# Patient Record
Sex: FEMALE | NOT HISPANIC OR LATINO
[De-identification: names, ages, dates, MRNs, and addresses within clinical notes are randomized per-mention and may not be internally consistent; named-entity substitution may affect disease eponyms.]

---

## 2020-08-18 DIAGNOSIS — Z11.59 ENCOUNTER FOR SCREENING FOR OTHER VIRAL DISEASES: ICD-10-CM

## 2020-09-01 ENCOUNTER — TRANSCRIPTION ENCOUNTER (OUTPATIENT)
Age: 35
End: 2020-09-01

## 2020-09-04 ENCOUNTER — APPOINTMENT (OUTPATIENT)
Dept: INTERNAL MEDICINE | Facility: CLINIC | Age: 35
End: 2020-09-04
Payer: COMMERCIAL

## 2020-09-04 ENCOUNTER — NON-APPOINTMENT (OUTPATIENT)
Age: 35
End: 2020-09-04

## 2020-09-04 VITALS — SYSTOLIC BLOOD PRESSURE: 100 MMHG | HEIGHT: 66 IN | DIASTOLIC BLOOD PRESSURE: 60 MMHG | OXYGEN SATURATION: 100 %

## 2020-09-04 VITALS — WEIGHT: 134 LBS | BODY MASS INDEX: 21.63 KG/M2 | TEMPERATURE: 97.7 F

## 2020-09-04 DIAGNOSIS — R01.1 CARDIAC MURMUR, UNSPECIFIED: ICD-10-CM

## 2020-09-04 DIAGNOSIS — Z82.3 FAMILY HISTORY OF STROKE: ICD-10-CM

## 2020-09-04 DIAGNOSIS — Z84.89 FAMILY HISTORY OF OTHER SPECIFIED CONDITIONS: ICD-10-CM

## 2020-09-04 DIAGNOSIS — Z78.9 OTHER SPECIFIED HEALTH STATUS: ICD-10-CM

## 2020-09-04 DIAGNOSIS — Z83.49 FAMILY HISTORY OF OTHER ENDOCRINE, NUTRITIONAL AND METABOLIC DISEASES: ICD-10-CM

## 2020-09-04 DIAGNOSIS — E04.2 NONTOXIC MULTINODULAR GOITER: ICD-10-CM

## 2020-09-04 DIAGNOSIS — Z92.89 PERSONAL HISTORY OF OTHER MEDICAL TREATMENT: ICD-10-CM

## 2020-09-04 DIAGNOSIS — Z00.00 ENCOUNTER FOR GENERAL ADULT MEDICAL EXAMINATION W/OUT ABNORMAL FINDINGS: ICD-10-CM

## 2020-09-04 DIAGNOSIS — Z83.3 FAMILY HISTORY OF DIABETES MELLITUS: ICD-10-CM

## 2020-09-04 DIAGNOSIS — Z82.62 FAMILY HISTORY OF OSTEOPOROSIS: ICD-10-CM

## 2020-09-04 DIAGNOSIS — H61.23 IMPACTED CERUMEN, BILATERAL: ICD-10-CM

## 2020-09-04 LAB
25(OH)D3 SERPL-MCNC: 40.6 NG/ML
ALBUMIN SERPL ELPH-MCNC: 4.9 G/DL
ALP BLD-CCNC: 42 U/L
ALT SERPL-CCNC: 32 U/L
ANION GAP SERPL CALC-SCNC: 12 MMOL/L
APPEARANCE: CLEAR
AST SERPL-CCNC: 26 U/L
BASOPHILS # BLD AUTO: 0.05 K/UL
BASOPHILS NFR BLD AUTO: 1.1 %
BILIRUB SERPL-MCNC: 0.9 MG/DL
BILIRUBIN URINE: NEGATIVE
BLOOD URINE: NEGATIVE
BUN SERPL-MCNC: 11 MG/DL
CALCIUM SERPL-MCNC: 9.8 MG/DL
CHLORIDE SERPL-SCNC: 103 MMOL/L
CHOLEST SERPL-MCNC: 219 MG/DL
CHOLEST/HDLC SERPL: 3 RATIO
CO2 SERPL-SCNC: 25 MMOL/L
COLOR: NORMAL
CREAT SERPL-MCNC: 0.76 MG/DL
DSDNA AB SER-ACNC: <12 IU/ML
EOSINOPHIL # BLD AUTO: 0.11 K/UL
EOSINOPHIL NFR BLD AUTO: 2.3 %
ESTIMATED AVERAGE GLUCOSE: 103 MG/DL
GLUCOSE QUALITATIVE U: NEGATIVE
GLUCOSE SERPL-MCNC: 91 MG/DL
HBA1C MFR BLD HPLC: 5.2 %
HCT VFR BLD CALC: 41.7 %
HDLC SERPL-MCNC: 74 MG/DL
HGB BLD-MCNC: 13 G/DL
IMM GRANULOCYTES NFR BLD AUTO: 0.2 %
KETONES URINE: NEGATIVE
LDLC SERPL CALC-MCNC: 133 MG/DL
LEUKOCYTE ESTERASE URINE: NEGATIVE
LYMPHOCYTES # BLD AUTO: 1.59 K/UL
LYMPHOCYTES NFR BLD AUTO: 33.4 %
M TB IFN-G BLD-IMP: NEGATIVE
MAN DIFF?: NORMAL
MCHC RBC-ENTMCNC: 29 PG
MCHC RBC-ENTMCNC: 31.2 GM/DL
MCV RBC AUTO: 92.9 FL
MONOCYTES # BLD AUTO: 0.39 K/UL
MONOCYTES NFR BLD AUTO: 8.2 %
NEUTROPHILS # BLD AUTO: 2.61 K/UL
NEUTROPHILS NFR BLD AUTO: 54.8 %
NITRITE URINE: NEGATIVE
PH URINE: 8
PLATELET # BLD AUTO: 217 K/UL
POTASSIUM SERPL-SCNC: 4.6 MMOL/L
PROT SERPL-MCNC: 6.8 G/DL
PROTEIN URINE: NORMAL
QUANTIFERON TB PLUS MITOGEN MINUS NIL: 7.18 IU/ML
QUANTIFERON TB PLUS NIL: 0.07 IU/ML
QUANTIFERON TB PLUS TB1 MINUS NIL: 0.14 IU/ML
QUANTIFERON TB PLUS TB2 MINUS NIL: 0.07 IU/ML
RBC # BLD: 4.49 M/UL
RBC # FLD: 13.1 %
SARS-COV-2 IGG SERPL IA-ACNC: <3.8 AU/ML
SARS-COV-2 IGG SERPL QL IA: NEGATIVE
SODIUM SERPL-SCNC: 140 MMOL/L
SPECIFIC GRAVITY URINE: 1.02
TRIGL SERPL-MCNC: 61 MG/DL
TSH SERPL-ACNC: 1.14 UIU/ML
UROBILINOGEN URINE: NORMAL
WBC # FLD AUTO: 4.76 K/UL

## 2020-09-04 PROCEDURE — 93000 ELECTROCARDIOGRAM COMPLETE: CPT

## 2020-09-04 PROCEDURE — 99203 OFFICE O/P NEW LOW 30 MIN: CPT | Mod: 25

## 2020-09-04 PROCEDURE — 99385 PREV VISIT NEW AGE 18-39: CPT | Mod: 25

## 2020-09-05 PROBLEM — Z78.9 SOCIAL ALCOHOL USE: Status: ACTIVE | Noted: 2020-09-05

## 2020-09-05 PROBLEM — R01.1 CARDIAC MURMUR: Status: ACTIVE | Noted: 2020-09-05

## 2020-09-05 PROBLEM — Z92.89 HISTORY OF POSITIVE DOUBLE STRANDED DNA ANTIBODY TEST: Status: RESOLVED | Noted: 2020-09-05 | Resolved: 2020-09-05

## 2020-09-05 PROBLEM — Z82.3 FAMILY HISTORY OF CEREBROVASCULAR ACCIDENT (CVA): Status: ACTIVE | Noted: 2020-09-05

## 2020-09-05 PROBLEM — H61.23 BILATERAL IMPACTED CERUMEN: Status: ACTIVE | Noted: 2020-09-05

## 2020-09-05 PROBLEM — Z84.89 PATIENT'S FATHER IS DECEASED: Status: ACTIVE | Noted: 2020-09-05

## 2020-09-05 PROBLEM — E04.2 MULTIPLE THYROID NODULES: Status: ACTIVE | Noted: 2020-09-04

## 2020-09-05 RX ORDER — MULTIVIT-MIN/IRON/FOLIC ACID/K 18-600-40
500 CAPSULE ORAL DAILY
Refills: 0 | Status: ACTIVE | COMMUNITY
Start: 2020-09-05

## 2020-09-05 RX ORDER — OMEGA-3/DHA/EPA/FISH OIL 1200 MG
1200 CAPSULE ORAL DAILY
Refills: 0 | Status: ACTIVE | COMMUNITY
Start: 2020-09-05

## 2020-09-08 PROBLEM — Z82.62 FAMILY HISTORY OF OSTEOPOROSIS: Status: ACTIVE | Noted: 2020-09-08

## 2020-09-08 PROBLEM — Z83.3 FAMILY HISTORY OF TYPE 2 DIABETES MELLITUS: Status: ACTIVE | Noted: 2020-09-08

## 2020-09-08 PROBLEM — Z83.49 FAMILY HISTORY OF THYROID DISEASE: Status: ACTIVE | Noted: 2020-09-08

## 2020-09-18 ENCOUNTER — APPOINTMENT (OUTPATIENT)
Dept: OBGYN | Facility: CLINIC | Age: 35
End: 2020-09-18
Payer: COMMERCIAL

## 2020-09-18 VITALS
DIASTOLIC BLOOD PRESSURE: 76 MMHG | BODY MASS INDEX: 22.66 KG/M2 | HEIGHT: 66 IN | HEART RATE: 74 BPM | WEIGHT: 141 LBS | SYSTOLIC BLOOD PRESSURE: 116 MMHG

## 2020-09-18 DIAGNOSIS — Z01.411 ENCOUNTER FOR GYNECOLOGICAL EXAMINATION (GENERAL) (ROUTINE) WITH ABNORMAL FINDINGS: ICD-10-CM

## 2020-09-18 DIAGNOSIS — E04.1 NONTOXIC SINGLE THYROID NODULE: ICD-10-CM

## 2020-09-18 PROCEDURE — 99385 PREV VISIT NEW AGE 18-39: CPT

## 2020-09-18 NOTE — DISCUSSION/SUMMARY
[FreeTextEntry1] : A - WWV\par       family hx of breast ca\par        left breast mass\par       enlarged right  lobe of thyroid\par \par P- - diagnostic left breast mammo and sono referral given \par        pap done\par         nutritional counseling provided\par        exercise encouraged \par       sono of thyroid referral given \par      literature on egg freezing program given

## 2020-09-18 NOTE — PHYSICAL EXAM
[Appropriately responsive] : appropriately responsive [Alert] : alert [No Acute Distress] : no acute distress [Thyroid Nodule] : thyroid nodule [Regular Rate Rhythm] : regular rate rhythm [No Murmurs] : no murmurs [Clear to Auscultation B/L] : clear to auscultation bilaterally [Soft] : soft [Non-tender] : non-tender [Non-distended] : non-distended [No HSM] : No HSM [No Lesions] : no lesions [No Mass] : no mass [Oriented x3] : oriented x3 [___cm] : a ~M [unfilled] ~Ucm superior medial quadrant mass was palpated [Labia Majora] : normal [Labia Minora] : normal [Normal] : normal [Retroversion] : retroverted [Uterine Adnexae] : normal

## 2020-09-18 NOTE — HISTORY OF PRESENT ILLNESS
[FreeTextEntry1] : 35 y.o. P 0000  LNMP 9/11/20, regular cycles presents for annual exam. pt is a 1st year resident in [employer], pt feels well, PMH - negative, PSHx - cyst removal from left side of neck, - 2006, FH - Breast ca - PGM, MGM, mat. great aunt , aunt. Type 2 Diabetes - MGF, Hypothyroidism - MGM, mat. aunts . SH- negative, GYN hx - bartholin cyst, drainage on left side - 2016.  ROS- pt has right lobe thyroid nodule, currently being investigated with repeat sono. pt also had a concern with palpable area in left breast , not realized by sono . pt is scheduled for repeat sono next month.

## 2020-09-21 LAB — HPV HIGH+LOW RISK DNA PNL CVX: NOT DETECTED

## 2020-09-23 LAB — CYTOLOGY CVX/VAG DOC THIN PREP: NORMAL

## 2020-10-08 ENCOUNTER — APPOINTMENT (OUTPATIENT)
Dept: ULTRASOUND IMAGING | Facility: CLINIC | Age: 35
End: 2020-10-08
Payer: COMMERCIAL

## 2020-10-08 ENCOUNTER — RESULT REVIEW (OUTPATIENT)
Age: 35
End: 2020-10-08

## 2020-10-08 ENCOUNTER — OUTPATIENT (OUTPATIENT)
Dept: OUTPATIENT SERVICES | Facility: HOSPITAL | Age: 35
LOS: 1 days | End: 2020-10-08
Payer: COMMERCIAL

## 2020-10-08 ENCOUNTER — APPOINTMENT (OUTPATIENT)
Dept: MAMMOGRAPHY | Facility: CLINIC | Age: 35
End: 2020-10-08
Payer: COMMERCIAL

## 2020-10-08 DIAGNOSIS — E04.1 NONTOXIC SINGLE THYROID NODULE: ICD-10-CM

## 2020-10-08 PROCEDURE — 76536 US EXAM OF HEAD AND NECK: CPT | Mod: 26

## 2020-10-08 PROCEDURE — 77066 DX MAMMO INCL CAD BI: CPT | Mod: 26

## 2020-10-08 PROCEDURE — G0279: CPT | Mod: 26

## 2020-10-08 PROCEDURE — 76641 ULTRASOUND BREAST COMPLETE: CPT | Mod: 26,50

## 2020-10-08 PROCEDURE — 76536 US EXAM OF HEAD AND NECK: CPT

## 2020-10-08 PROCEDURE — 77066 DX MAMMO INCL CAD BI: CPT

## 2020-10-08 PROCEDURE — 76641 ULTRASOUND BREAST COMPLETE: CPT

## 2020-10-08 PROCEDURE — G0279: CPT

## 2020-10-16 ENCOUNTER — APPOINTMENT (OUTPATIENT)
Dept: ULTRASOUND IMAGING | Facility: CLINIC | Age: 35
End: 2020-10-16
Payer: COMMERCIAL

## 2020-10-16 ENCOUNTER — OUTPATIENT (OUTPATIENT)
Dept: OUTPATIENT SERVICES | Facility: HOSPITAL | Age: 35
LOS: 1 days | End: 2020-10-16
Payer: COMMERCIAL

## 2020-10-16 ENCOUNTER — RESULT REVIEW (OUTPATIENT)
Age: 35
End: 2020-10-16

## 2020-10-16 DIAGNOSIS — Z00.8 ENCOUNTER FOR OTHER GENERAL EXAMINATION: ICD-10-CM

## 2020-10-16 DIAGNOSIS — N63.20 UNSPECIFIED LUMP IN THE LEFT BREAST, UNSPECIFIED QUADRANT: ICD-10-CM

## 2020-10-16 PROCEDURE — 77065 DX MAMMO INCL CAD UNI: CPT

## 2020-10-16 PROCEDURE — 88360 TUMOR IMMUNOHISTOCHEM/MANUAL: CPT | Mod: 26

## 2020-10-16 PROCEDURE — 88360 TUMOR IMMUNOHISTOCHEM/MANUAL: CPT

## 2020-10-16 PROCEDURE — 19083 BX BREAST 1ST LESION US IMAG: CPT | Mod: LT

## 2020-10-16 PROCEDURE — 77065 DX MAMMO INCL CAD UNI: CPT | Mod: 26,LT

## 2020-10-16 PROCEDURE — 88305 TISSUE EXAM BY PATHOLOGIST: CPT | Mod: 26

## 2020-10-16 PROCEDURE — 19084 BX BREAST ADD LESION US IMAG: CPT | Mod: LT

## 2020-10-16 PROCEDURE — 19084 BX BREAST ADD LESION US IMAG: CPT

## 2020-10-16 PROCEDURE — 88305 TISSUE EXAM BY PATHOLOGIST: CPT

## 2020-10-16 PROCEDURE — A4648: CPT

## 2020-10-16 PROCEDURE — 19083 BX BREAST 1ST LESION US IMAG: CPT

## 2020-10-19 LAB — SURGICAL PATHOLOGY STUDY: SIGNIFICANT CHANGE UP

## 2020-10-20 ENCOUNTER — NON-APPOINTMENT (OUTPATIENT)
Age: 35
End: 2020-10-20

## 2020-10-21 ENCOUNTER — OUTPATIENT (OUTPATIENT)
Dept: OUTPATIENT SERVICES | Facility: HOSPITAL | Age: 35
LOS: 1 days | End: 2020-10-21
Payer: COMMERCIAL

## 2020-10-21 ENCOUNTER — RESULT REVIEW (OUTPATIENT)
Age: 35
End: 2020-10-21

## 2020-10-21 ENCOUNTER — APPOINTMENT (OUTPATIENT)
Dept: MRI IMAGING | Facility: IMAGING CENTER | Age: 35
End: 2020-10-21
Payer: COMMERCIAL

## 2020-10-21 DIAGNOSIS — Z00.8 ENCOUNTER FOR OTHER GENERAL EXAMINATION: ICD-10-CM

## 2020-10-21 LAB — NON-GYNECOLOGICAL CYTOLOGY STUDY: SIGNIFICANT CHANGE UP

## 2020-10-21 PROCEDURE — 77049 MRI BREAST C-+ W/CAD BI: CPT | Mod: 26

## 2020-10-21 PROCEDURE — C8908: CPT

## 2020-10-21 PROCEDURE — C8937: CPT

## 2020-10-21 PROCEDURE — A9585: CPT

## 2020-10-26 ENCOUNTER — APPOINTMENT (OUTPATIENT)
Dept: SURGERY | Facility: CLINIC | Age: 35
End: 2020-10-26
Payer: COMMERCIAL

## 2020-10-26 PROCEDURE — 99205K: CUSTOM

## 2020-10-27 ENCOUNTER — APPOINTMENT (OUTPATIENT)
Dept: NUCLEAR MEDICINE | Facility: IMAGING CENTER | Age: 35
End: 2020-10-27
Payer: COMMERCIAL

## 2020-10-27 ENCOUNTER — RESULT REVIEW (OUTPATIENT)
Age: 35
End: 2020-10-27

## 2020-10-27 ENCOUNTER — APPOINTMENT (OUTPATIENT)
Dept: CT IMAGING | Facility: IMAGING CENTER | Age: 35
End: 2020-10-27
Payer: COMMERCIAL

## 2020-10-27 ENCOUNTER — OUTPATIENT (OUTPATIENT)
Dept: OUTPATIENT SERVICES | Facility: HOSPITAL | Age: 35
LOS: 1 days | End: 2020-10-27
Payer: COMMERCIAL

## 2020-10-27 ENCOUNTER — OUTPATIENT (OUTPATIENT)
Dept: OUTPATIENT SERVICES | Facility: HOSPITAL | Age: 35
LOS: 1 days | Discharge: HOME | End: 2020-10-27

## 2020-10-27 DIAGNOSIS — Z00.8 ENCOUNTER FOR OTHER GENERAL EXAMINATION: ICD-10-CM

## 2020-10-27 DIAGNOSIS — C50.919 MALIGNANT NEOPLASM OF UNSPECIFIED SITE OF UNSPECIFIED FEMALE BREAST: ICD-10-CM

## 2020-10-27 PROCEDURE — 71260 CT THORAX DX C+: CPT | Mod: 26

## 2020-10-27 PROCEDURE — 78306 BONE IMAGING WHOLE BODY: CPT

## 2020-10-27 PROCEDURE — 78306 BONE IMAGING WHOLE BODY: CPT | Mod: 26

## 2020-10-27 PROCEDURE — 78830 RP LOCLZJ TUM SPECT W/CT 1: CPT

## 2020-10-27 PROCEDURE — 78830 RP LOCLZJ TUM SPECT W/CT 1: CPT | Mod: 26

## 2020-10-27 PROCEDURE — 71260 CT THORAX DX C+: CPT

## 2020-10-27 PROCEDURE — A9561: CPT

## 2020-10-27 PROCEDURE — 74177 CT ABD & PELVIS W/CONTRAST: CPT | Mod: 26

## 2020-10-27 PROCEDURE — 74177 CT ABD & PELVIS W/CONTRAST: CPT

## 2020-10-28 ENCOUNTER — RESULT REVIEW (OUTPATIENT)
Age: 35
End: 2020-10-28

## 2020-10-28 ENCOUNTER — APPOINTMENT (OUTPATIENT)
Dept: HEMATOLOGY ONCOLOGY | Facility: CLINIC | Age: 35
End: 2020-10-28
Payer: COMMERCIAL

## 2020-10-28 VITALS
HEART RATE: 92 BPM | OXYGEN SATURATION: 99 % | TEMPERATURE: 99.1 F | SYSTOLIC BLOOD PRESSURE: 123 MMHG | HEIGHT: 66.85 IN | BODY MASS INDEX: 22.49 KG/M2 | RESPIRATION RATE: 16 BRPM | WEIGHT: 143.3 LBS | DIASTOLIC BLOOD PRESSURE: 61 MMHG

## 2020-10-28 DIAGNOSIS — Z80.3 FAMILY HISTORY OF MALIGNANT NEOPLASM OF BREAST: ICD-10-CM

## 2020-10-28 LAB
BASOPHILS # BLD AUTO: 0.03 K/UL — SIGNIFICANT CHANGE UP (ref 0–0.2)
BASOPHILS NFR BLD AUTO: 0.5 % — SIGNIFICANT CHANGE UP (ref 0–2)
EOSINOPHIL # BLD AUTO: 0.08 K/UL — SIGNIFICANT CHANGE UP (ref 0–0.5)
EOSINOPHIL NFR BLD AUTO: 1.4 % — SIGNIFICANT CHANGE UP (ref 0–6)
HCT VFR BLD CALC: 38.5 % — SIGNIFICANT CHANGE UP (ref 34.5–45)
HGB BLD-MCNC: 12.5 G/DL — SIGNIFICANT CHANGE UP (ref 11.5–15.5)
IMM GRANULOCYTES NFR BLD AUTO: 0.2 % — SIGNIFICANT CHANGE UP (ref 0–1.5)
LYMPHOCYTES # BLD AUTO: 1.5 K/UL — SIGNIFICANT CHANGE UP (ref 1–3.3)
LYMPHOCYTES # BLD AUTO: 27.1 % — SIGNIFICANT CHANGE UP (ref 13–44)
MCHC RBC-ENTMCNC: 29.7 PG — SIGNIFICANT CHANGE UP (ref 27–34)
MCHC RBC-ENTMCNC: 32.5 G/DL — SIGNIFICANT CHANGE UP (ref 32–36)
MCV RBC AUTO: 91.4 FL — SIGNIFICANT CHANGE UP (ref 80–100)
MONOCYTES # BLD AUTO: 0.46 K/UL — SIGNIFICANT CHANGE UP (ref 0–0.9)
MONOCYTES NFR BLD AUTO: 8.3 % — SIGNIFICANT CHANGE UP (ref 2–14)
NEUTROPHILS # BLD AUTO: 3.45 K/UL — SIGNIFICANT CHANGE UP (ref 1.8–7.4)
NEUTROPHILS NFR BLD AUTO: 62.5 % — SIGNIFICANT CHANGE UP (ref 43–77)
NRBC # BLD: 0 /100 WBCS — SIGNIFICANT CHANGE UP (ref 0–0)
PLATELET # BLD AUTO: 209 K/UL — SIGNIFICANT CHANGE UP (ref 150–400)
RBC # BLD: 4.21 M/UL — SIGNIFICANT CHANGE UP (ref 3.8–5.2)
RBC # FLD: 13.2 % — SIGNIFICANT CHANGE UP (ref 10.3–14.5)
WBC # BLD: 5.53 K/UL — SIGNIFICANT CHANGE UP (ref 3.8–10.5)
WBC # FLD AUTO: 5.53 K/UL — SIGNIFICANT CHANGE UP (ref 3.8–10.5)

## 2020-10-28 PROCEDURE — 99205 OFFICE O/P NEW HI 60 MIN: CPT

## 2020-10-28 PROCEDURE — 99354: CPT

## 2020-10-28 PROCEDURE — 99072 ADDL SUPL MATRL&STAF TM PHE: CPT

## 2020-10-28 NOTE — HISTORY OF PRESENT ILLNESS
[T: ___] : T[unfilled] [N: ___] : N[unfilled] [M: ___] : M[unfilled] [AJCC Stage: ____] : AJCC Stage: [unfilled] [de-identified] : Ms.Ana López is a 35 year old female here for an evaluation of breast cancer. Her oncologic history is as follows:\par \par Patient felt a palpable lump in 8/2020. She underwent breast imaging on 10/8/2020 BIRADS 4B which showed  a 3.2 cm mass in the central upper slightly inner left breast and a lobulated solid mass with cystic components measuring 2.6 x 2.5 x 3.3 cm. There is also a smaller anterior mass measuring 7mm. US showed a large lobulated mass in the central upper inner left breast at the 10:00 axis 2 cm FN.  She underwent a left breast 10 o'clock, 2 cm FN, ultrasound guided core biopsy on 10/16/2020 which showed invasive poorly differentiated ductal carcinoma with focal central necrosis, [place] score 8/9 measuring least 0.8 cm, ER Positive, 10%, PgR Negative, 0%, HER-2 Negative\par \par She underwent a breast MRI on 10/21/2020 BIRADS 6 which showed  left breast malignancy, a dominant mass in the left inner breast, measuring 5.0 cm. There are multiple surrounding satellite masses. The majority of the upper inner and central breast demonstrates disease. The dominant mass and associated satellites measure approximately 6.5 cm transverse x 8.5 cm AP x  4.5 cm sagittal in overall greatest dimension. There is associated parenchymal edema and skin thickening involving the inner breast. There is also nonspecific enhancement of the left nipple. There are multiple matted lymph nodes in the left axilla. No evidence of internal mammary adenopathy. A nonenhancing cystic mass in the left anterior chest/mediastinum was also noted. \par \par 10/27/2020 NM BONE SCAN :No scan evidence of osseous metastasis.\par 9/4/2020 EKG NSR \par CT 10/27/2020 MARI, + epicardial cyst\par \par She has been  x 10 years.  is a  in [place]. Her family is in [place]. No children. She lives in [facility] and considering moving to [place]. \par \par Genetic testing sent 10/26/2020\par \par

## 2020-10-28 NOTE — CONSULT LETTER
[Dear  ___] : Dear  [unfilled], [Consult Letter:] : I had the pleasure of evaluating your patient, [unfilled]. [Please see my note below.] : Please see my note below. [Consult Closing:] : Thank you very much for allowing me to participate in the care of this patient.  If you have any questions, please do not hesitate to contact me. [Sincerely,] : Sincerely, [FreeTextEntry3] : Donya Ayoub MD\par Division of Medical Oncology and Hematology\par [facility] [place]\par [employer] [facility]\par [place], [place] [DrLv  ___] : Dr. LOPES [DrLv ___] : Dr. LOPES

## 2020-10-28 NOTE — REASON FOR VISIT
[Initial Consultation] : an initial consultation [Other: _____] : [unfilled] [FreeTextEntry2] : left breast ca

## 2020-10-28 NOTE — ASSESSMENT
[FreeTextEntry1] : Ms. RIANNA HERNANDEZ is a 35 year old premenopausal female who is diagnosed with clinically T2, N1, M0 stage IIB ER 10%, LA negative, HER-2/stella negative left breast poorly differentiated invasive ductal carcinoma.  CT scan chest abdomen pelvis and a bone scan is negative for distant metastases.\par \par I reviewed the role of surgery, chemotherapy, radiation therapy and endocrine therapy for curative treatment of breast cancer. I discussed the role of neoadjuvant chemotherapy with the patient including improved surgical outcomes, and early control of occult metastatic disease.  I recommended neoadjuvant chemotherapy with dose dense Adriamycin and cyclophosphamide for 4 cycles with growth factor support, followed by weekly Paclitaxel for both local and systemic control. \par \par I discussed the expected and unexpected side effects of chemotherapy, including but not limited to hair loss, fatigue, change in taste, mouth sores, nausea, vomiting, diarrhea, infusion reaction, allergic reaction, significant myelosuppression, need for blood transfusion, infection, bleeding, cardiac toxicity, neuropathy, chemical cystitis, kidney injury, nerve pain, muscle pain and detrimental effect on liver and heart function. I also discussed a small but potential risk of development of acute leukemia with the use of Adriamycin and cyclophosphamide therapy.\par \par The patient understood all the potential side effects and signed an informed consent after discussing the risks, benefits and alternatives.\par \par The patient is premenopausal. I discussed that her periods might stop after chemotherapy.  Chemotherapy is known to cause infertility and premature menopause.  She has been  for 10 years.  She is interested in childbearing.  I encouraged her to meet with Dr. Watkins to discuss reproductive endocrinology options.  Our breast cancer nurse navigator will expedite the appointment.\par \par In preparation of chemotherapy I will obtain an echocardiogram. She will be premedicated with Emend, Dexamethasone and Aloxi to decrease severe emetogenicity with the chemotherapy. We'll obtain blood work including complete metabolic profile, acute hepatitis panel and coagulation profile. My team will arrange for Mediport placement by interventional radiology. \par \par Patient is complaining of unexplained urinary incontinence during sleep x2 in the last year.  She has dizziness and vertigo type symptoms since diagnosis.  We discussed to do a brain MRI to rule out metastases (although unlikely).\par \par Genetic testing has been sent.  We will follow up the results.\par \par She will undergo surgery after neoadjuvant chemo.  Pathologic response will guide further treatment.  If she has residual disease, we will recheck ER/LA/HER-2.  Although she is ER 10% positive, aggressiveness of her cancer is more indicative of triple negative/basal subtype.  Given low ER positive disease, endocrine therapy will be considered.  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  Her family and spouse are in [place].  Her family wants her to come back to [place] for treatment but she wants to start treatment as soon as possible here in [place].  Her  was present on phone today.  He is willing to come here to help her.  [facility] paperwork will be filled out\par \par The patient had plenty of time to ask questions and all of her questions were answered to her satisfaction. I gave her my office phone number and encouraged her to call with any questions or additional information.\par  [Curative] : Goals of care discussed with patient: Curative

## 2020-10-28 NOTE — PHYSICAL EXAM
[Fully active, able to carry on all pre-disease performance without restriction] : Status 0 - Fully active, able to carry on all pre-disease performance without restriction [Normal] : affect appropriate [de-identified] : left breast: visible lump at 10:00. no skin changes or nipple retraction. On palpation- 5 cm hard, non tender, mobile mass at 10:00. + palpable left axillary LN. No abnormal right breast findings. No palpable cervical LN

## 2020-10-29 ENCOUNTER — APPOINTMENT (OUTPATIENT)
Dept: CV DIAGNOSITCS | Facility: HOSPITAL | Age: 35
End: 2020-10-29

## 2020-10-29 ENCOUNTER — APPOINTMENT (OUTPATIENT)
Dept: HUMAN REPRODUCTION | Facility: CLINIC | Age: 35
End: 2020-10-29
Payer: COMMERCIAL

## 2020-10-29 ENCOUNTER — OUTPATIENT (OUTPATIENT)
Dept: OUTPATIENT SERVICES | Facility: HOSPITAL | Age: 35
LOS: 1 days | End: 2020-10-29
Payer: COMMERCIAL

## 2020-10-29 DIAGNOSIS — C50.912 MALIGNANT NEOPLASM OF UNSPECIFIED SITE OF LEFT FEMALE BREAST: ICD-10-CM

## 2020-10-29 LAB
ALBUMIN SERPL ELPH-MCNC: 4.8 G/DL
ALP BLD-CCNC: 51 U/L
ALT SERPL-CCNC: 18 U/L
ANION GAP SERPL CALC-SCNC: 12 MMOL/L
APTT BLD: 27.8 SEC
AST SERPL-CCNC: 22 U/L
BILIRUB SERPL-MCNC: 0.7 MG/DL
BUN SERPL-MCNC: 13 MG/DL
CALCIUM SERPL-MCNC: 9.8 MG/DL
CHLORIDE SERPL-SCNC: 102 MMOL/L
CO2 SERPL-SCNC: 26 MMOL/L
CREAT SERPL-MCNC: 0.72 MG/DL
GLUCOSE SERPL-MCNC: 90 MG/DL
HAV IGM SER QL: NONREACTIVE
HBV CORE IGM SER QL: NONREACTIVE
HBV SURFACE AG SER QL: NONREACTIVE
HCV AB SER QL: NONREACTIVE
HCV S/CO RATIO: 0.08 S/CO
INR PPP: 0.94 RATIO
POTASSIUM SERPL-SCNC: 4.4 MMOL/L
PROT SERPL-MCNC: 7.1 G/DL
PT BLD: 11.2 SEC
SODIUM SERPL-SCNC: 140 MMOL/L

## 2020-10-29 PROCEDURE — 76830 TRANSVAGINAL US NON-OB: CPT

## 2020-10-29 PROCEDURE — 76376 3D RENDER W/INTRP POSTPROCES: CPT | Mod: 26

## 2020-10-29 PROCEDURE — 76376 3D RENDER W/INTRP POSTPROCES: CPT

## 2020-10-29 PROCEDURE — 99205 OFFICE O/P NEW HI 60 MIN: CPT | Mod: 25

## 2020-10-29 PROCEDURE — 93306 TTE W/DOPPLER COMPLETE: CPT

## 2020-10-29 PROCEDURE — 93356 MYOCRD STRAIN IMG SPCKL TRCK: CPT

## 2020-10-29 PROCEDURE — 93306 TTE W/DOPPLER COMPLETE: CPT | Mod: 26

## 2020-10-29 PROCEDURE — 99072 ADDL SUPL MATRL&STAF TM PHE: CPT

## 2020-10-29 PROCEDURE — 36415 COLL VENOUS BLD VENIPUNCTURE: CPT

## 2020-10-30 ENCOUNTER — OUTPATIENT (OUTPATIENT)
Dept: OUTPATIENT SERVICES | Facility: HOSPITAL | Age: 35
LOS: 1 days | End: 2020-10-30
Payer: COMMERCIAL

## 2020-10-30 DIAGNOSIS — Z11.59 ENCOUNTER FOR SCREENING FOR OTHER VIRAL DISEASES: ICD-10-CM

## 2020-10-30 LAB — SARS-COV-2 RNA SPEC QL NAA+PROBE: SIGNIFICANT CHANGE UP

## 2020-10-30 PROCEDURE — U0003: CPT

## 2020-11-02 ENCOUNTER — OUTPATIENT (OUTPATIENT)
Dept: OUTPATIENT SERVICES | Facility: HOSPITAL | Age: 35
LOS: 1 days | End: 2020-11-02
Payer: COMMERCIAL

## 2020-11-02 ENCOUNTER — RESULT REVIEW (OUTPATIENT)
Age: 35
End: 2020-11-02

## 2020-11-02 VITALS
OXYGEN SATURATION: 100 % | RESPIRATION RATE: 15 BRPM | SYSTOLIC BLOOD PRESSURE: 101 MMHG | HEART RATE: 75 BPM | DIASTOLIC BLOOD PRESSURE: 66 MMHG

## 2020-11-02 VITALS
SYSTOLIC BLOOD PRESSURE: 103 MMHG | OXYGEN SATURATION: 99 % | TEMPERATURE: 99 F | HEART RATE: 64 BPM | DIASTOLIC BLOOD PRESSURE: 52 MMHG | HEIGHT: 66 IN | WEIGHT: 143.08 LBS | RESPIRATION RATE: 16 BRPM

## 2020-11-02 DIAGNOSIS — C50.912 MALIGNANT NEOPLASM OF UNSPECIFIED SITE OF LEFT FEMALE BREAST: ICD-10-CM

## 2020-11-02 PROCEDURE — C1788: CPT

## 2020-11-02 PROCEDURE — 76937 US GUIDE VASCULAR ACCESS: CPT

## 2020-11-02 PROCEDURE — 76937 US GUIDE VASCULAR ACCESS: CPT | Mod: 26

## 2020-11-02 PROCEDURE — C1894: CPT

## 2020-11-02 PROCEDURE — C1769: CPT

## 2020-11-02 PROCEDURE — 36561 INSERT TUNNELED CV CATH: CPT

## 2020-11-02 PROCEDURE — 77001 FLUOROGUIDE FOR VEIN DEVICE: CPT

## 2020-11-02 PROCEDURE — 77001 FLUOROGUIDE FOR VEIN DEVICE: CPT | Mod: 26

## 2020-11-02 RX ORDER — SODIUM CHLORIDE 9 MG/ML
10 INJECTION INTRAMUSCULAR; INTRAVENOUS; SUBCUTANEOUS
Refills: 0 | Status: DISCONTINUED | OUTPATIENT
Start: 2020-11-02 | End: 2020-11-16

## 2020-11-02 RX ORDER — CHLORHEXIDINE GLUCONATE 213 G/1000ML
1 SOLUTION TOPICAL
Refills: 0 | Status: DISCONTINUED | OUTPATIENT
Start: 2020-11-02 | End: 2020-11-16

## 2020-11-02 NOTE — ASU DISCHARGE PLAN (ADULT/PEDIATRIC) - ASU DC SPECIAL INSTRUCTIONSFT
Chest Port Placement    Discharge Instructions  - You had a chest port implanted in your chest.   - The port is ready for use.  - You may shower in 48 hours. No soaking or swimming for 2 weeks or until the site is completely healed.  - Keep the area covered and dry for the next 2days. It may be removed by a chemotherapy nurse as needed for treatment.  - Do not perform any heavy lifting or put tension on the area for the next week or until the site is healed.  - You may resume your normal diet.  - You may resume your normal medications however you should wait 48 hours before restarting aspirin, plavix, or blood thinners.  - It is normal to experience some pain over the site for the next few days. You may take Tylenol for that pain. Do not take more frequently than every 6 hours and do not exceed more than 3000mg of Tylenol in a 24 hour period. If you feel like you need stronger pain medication, please call us as you should not experience significant pain from this procedure.   - You were given conscious sedation which may make you drowsy, therefore you need someone to stay with you until the morning following the procedure.  - Do not drive, engage in heavy lifting or strenuous activity, or drink any alcoholic beverages for the next 24 hours.   - You may resume normal activity in 24 hours.    Notify your primary physician and/or Interventional Radiology IMMEDIATELY if you experience any of the following       - Fever of 101F or 38C       - Chills or Rigors/ Shakes       - Swelling and/or Redness in the area around the port       - Worsening Pain       - Blood soaked bandages or worsening bleeding       - Lightheadedness and/or dizziness upon standing       - Chest Pain/ Tightness       - Shortness of Breath       - Difficulty walking    If you have a problem that you believe requires IMMEDIATE attention, please go to your NEAREST Emergency Room. If you believe your problem can safely wait until you speak to a physician, please call Interventional Radiology for any concerns.    Please feel free to contact us at [phone] if any problems arise. After 6PM, Monday through Friday, on weekends and on holidays, please call [phone] and ask for the radiology resident on call to be paged.

## 2020-11-02 NOTE — PRE-ANESTHESIA EVALUATION ADULT - BP NONINVASIVE DIASTOLIC (MM HG)
Referred by: Lisette Galloway MD; Medical Diagnosis (from order):    Diagnosis Information      Diagnosis    V54.11 (ICD-9-CM) - S42.201D (ICD-10-CM) - Closed fracture of proximal end of right humerus with routine healing, unspecified fracture morphology, subsequent encounter                Physical Therapy -  Daily Treatment Note    Visit:  11     SUBJECTIVE                                                                                                             Patient reports to be a little more sore today. States she was folding laundry yesterday which increased her pain and tried to do a little more with her exercises. She still would like to be able to put her hair up in a ponytail. States she felt fine after last treatment, always a little more sore after exercising.        Pain / Symptoms:  Pain rating (out of 10): Current: 2   Location: Anterior shoulder     OBJECTIVE                                                                                                                     Range of Motion (ROM) (norms in parentheses, measurement in degrees unless noted):   Shoulder Flexion (180): Right: Passive: 140   Shoulder Abduction (180):Right: Passive: 145      Palpation:   Comments / Details: Reports tenderness right bicep and infraspinatus area with palpation. Tightness noted pectoralis and infraspinatus area.       TREATMENT                                                                                                                  Therapeutic Exercise:  Name: Rafia Tang   Injury:   Closed fracture of proximal end of right humerus with routine healing, unspecified fracture morphology, subsequent encounter  (primary encounter diagnosis)  Right shoulder pain, unspecified chronicity     Precautions:   Closed fracture of proximal end of right humerus (1/19/20); 14 weeks as of 4/26/20  Fibromyalgia     Today's Exercises:    Right shoulder passive range of motion 12 minutes   Supine on incline bench  (45 degrees) right shoulder active assistive range of motion flexion with wand x22   Right shoulder External rotation with wand 2x10 with 5 second hold  Scapular retraction 20x3 seconds -deferred this session   Shoulder active assistive range of motion slides on total gym (level 34) flexion, scaption, and abduction/circles x22 each  Shoulder pulley flexion and abduction x 3 minutes each  Bicep curls 2# 2x10 bilateral   Wrist supination/pronation with hammer with right arm resting on table 1/4# cuff [id number]  Bent over row x20   Hand over hand at dowel ry on 30 inch box x10 with 3 second hold  Ball around waist 2# med ball x20 each  UBE  4 minutes; 2 minutes forward and retro  Seated on bench (45 degrees) chest press with 4# med ball x22        Only 1:1 skilled  therapy time billed   Manual Therapy:  Soft tissue mobilization to right shoulder girdle. Right subscapularis release.   Neuromuscular Re-Education:  Neuromuscular Re-education to improve quality of motion , improve posture and postural awareness, improve proprioception, improve coordination  and improve kinesthetic sense:      Right shoulder rhythmic stabilization in neutral 3x30 seconds  Ball on total gym up/down, side/side, and circles 2x10 each  Rows at speed pulley 10# 3x10  Prone on total gym (30 degrees) shoulder extension with scapular retraction 1# 2x10  Seated lat pull down with towel 20# x25       Skilled input: verbal instruction/cues, tactile instruction/cues and posture correction    Home Exercise Program: (*above indicates provided as part of home exercise program)  Right upper trap stretch 3x30 seconds  Shoulder pendulums 2x30 seconds  Supine right shoulder active assistive range of motion flexion with wand x15  Right shoulder External rotation with wand x15  Scapular retraction 15x3 seconds  Shoulder passive table slides flexion, scaption, and abduction x10 each  Bent over row  Elbow active range of motion flexion and  extension      ASSESSMENT                                                                                                             Slightly decreased tolerance for passive range of motion right shoulder today. Tenderness noted pectoralis and infraspinatus region, tolerated soft tissue mobility well. Patient demonstrated improved scapular stabilization today. No increase in exercises this visit due to subjective report of increased pain today. Patient to continue with home exercise program, adjusting repititions to minimize pain.       Procedures and total treatment time documented Time Entry flowsheet.     52

## 2020-11-02 NOTE — PROGRESS NOTE ADULT - SUBJECTIVE AND OBJECTIVE BOX
Interventional Radiology Brief- Operative Note    Procedure: Right chest wall venous access port insertion    Operators: Mike Alamo    Anesthesia (type): IV Sedation    Contrast: None    EBL: Minimal    Findings/Follow up Plan of Care: Patent RIJ on US. Successful insertion of an 8Fr slim port with its tip in the SVC.     Specimens Removed: None    Implants: 8Fr right chest wall slim power port    Complications: None    Condition/Disposition: Stable/Recovery    Please call Interventional Radiology x [phone] with any questions, concerns, or issues.

## 2020-11-03 RX ORDER — OMEPRAZOLE 40 MG/1
40 CAPSULE, DELAYED RELEASE ORAL
Qty: 30 | Refills: 1 | Status: ACTIVE | COMMUNITY
Start: 2020-11-03 | End: 1900-01-01

## 2020-11-03 RX ORDER — DEXAMETHASONE 4 MG/1
4 TABLET ORAL
Qty: 30 | Refills: 1 | Status: ACTIVE | COMMUNITY
Start: 2020-11-03 | End: 1900-01-01

## 2020-11-03 RX ORDER — APREPITANT 80 MG/1
80 CAPSULE ORAL DAILY
Qty: 4 | Refills: 0 | Status: ACTIVE | COMMUNITY
Start: 2020-11-03 | End: 1900-01-01

## 2020-11-03 RX ORDER — METOCLOPRAMIDE 10 MG/1
10 TABLET ORAL EVERY 6 HOURS
Qty: 60 | Refills: 3 | Status: ACTIVE | COMMUNITY
Start: 2020-11-03 | End: 1900-01-01

## 2020-11-03 RX ORDER — LORATADINE 10 MG/1
10 CAPSULE, LIQUID FILLED ORAL DAILY
Qty: 30 | Refills: 0 | Status: ACTIVE | COMMUNITY
Start: 2020-11-03 | End: 1900-01-01

## 2020-11-03 RX ORDER — LIDOCAINE AND PRILOCAINE 25; 25 MG/G; MG/G
2.5-2.5 CREAM TOPICAL
Qty: 1 | Refills: 0 | Status: ACTIVE | COMMUNITY
Start: 2020-11-03 | End: 1900-01-01

## 2020-11-04 ENCOUNTER — APPOINTMENT (OUTPATIENT)
Dept: INFUSION THERAPY | Facility: HOSPITAL | Age: 35
End: 2020-11-04

## 2020-11-04 ENCOUNTER — RESULT REVIEW (OUTPATIENT)
Age: 35
End: 2020-11-04

## 2020-11-04 ENCOUNTER — LABORATORY RESULT (OUTPATIENT)
Age: 35
End: 2020-11-04

## 2020-11-04 ENCOUNTER — APPOINTMENT (OUTPATIENT)
Dept: HEMATOLOGY ONCOLOGY | Facility: CLINIC | Age: 35
End: 2020-11-04
Payer: COMMERCIAL

## 2020-11-04 VITALS
OXYGEN SATURATION: 99 % | BODY MASS INDEX: 22.94 KG/M2 | SYSTOLIC BLOOD PRESSURE: 114 MMHG | WEIGHT: 146.17 LBS | TEMPERATURE: 97.9 F | RESPIRATION RATE: 16 BRPM | HEART RATE: 76 BPM | HEIGHT: 66.85 IN | DIASTOLIC BLOOD PRESSURE: 73 MMHG

## 2020-11-04 DIAGNOSIS — N63.20 UNSPECIFIED LUMP IN THE LEFT BREAST, UNSPECIFIED QUADRANT: ICD-10-CM

## 2020-11-04 DIAGNOSIS — R11.2 NAUSEA WITH VOMITING, UNSPECIFIED: ICD-10-CM

## 2020-11-04 DIAGNOSIS — R59.0 LOCALIZED ENLARGED LYMPH NODES: ICD-10-CM

## 2020-11-04 DIAGNOSIS — Z51.11 ENCOUNTER FOR ANTINEOPLASTIC CHEMOTHERAPY: ICD-10-CM

## 2020-11-04 DIAGNOSIS — Z51.89 ENCOUNTER FOR OTHER SPECIFIED AFTERCARE: ICD-10-CM

## 2020-11-04 DIAGNOSIS — C50.919 MALIGNANT NEOPLASM OF UNSPECIFIED SITE OF UNSPECIFIED FEMALE BREAST: ICD-10-CM

## 2020-11-04 PROBLEM — C50.912 MALIGNANT NEOPLASM OF UNSPECIFIED SITE OF LEFT FEMALE BREAST: Chronic | Status: ACTIVE | Noted: 2020-11-03

## 2020-11-04 LAB
BASOPHILS # BLD AUTO: 0.07 K/UL — SIGNIFICANT CHANGE UP (ref 0–0.2)
BASOPHILS NFR BLD AUTO: 1 % — SIGNIFICANT CHANGE UP (ref 0–2)
EOSINOPHIL # BLD AUTO: 0.17 K/UL — SIGNIFICANT CHANGE UP (ref 0–0.5)
EOSINOPHIL NFR BLD AUTO: 2.5 % — SIGNIFICANT CHANGE UP (ref 0–6)
HCT VFR BLD CALC: 37.6 % — SIGNIFICANT CHANGE UP (ref 34.5–45)
HGB BLD-MCNC: 12.2 G/DL — SIGNIFICANT CHANGE UP (ref 11.5–15.5)
IMM GRANULOCYTES NFR BLD AUTO: 0.7 % — SIGNIFICANT CHANGE UP (ref 0–1.5)
LYMPHOCYTES # BLD AUTO: 2.04 K/UL — SIGNIFICANT CHANGE UP (ref 1–3.3)
LYMPHOCYTES # BLD AUTO: 29.9 % — SIGNIFICANT CHANGE UP (ref 13–44)
MCHC RBC-ENTMCNC: 29.2 PG — SIGNIFICANT CHANGE UP (ref 27–34)
MCHC RBC-ENTMCNC: 32.4 G/DL — SIGNIFICANT CHANGE UP (ref 32–36)
MCV RBC AUTO: 90 FL — SIGNIFICANT CHANGE UP (ref 80–100)
MONOCYTES # BLD AUTO: 0.5 K/UL — SIGNIFICANT CHANGE UP (ref 0–0.9)
MONOCYTES NFR BLD AUTO: 7.3 % — SIGNIFICANT CHANGE UP (ref 2–14)
NEUTROPHILS # BLD AUTO: 4 K/UL — SIGNIFICANT CHANGE UP (ref 1.8–7.4)
NEUTROPHILS NFR BLD AUTO: 58.6 % — SIGNIFICANT CHANGE UP (ref 43–77)
NRBC # BLD: 0 /100 WBCS — SIGNIFICANT CHANGE UP (ref 0–0)
PLATELET # BLD AUTO: 193 K/UL — SIGNIFICANT CHANGE UP (ref 150–400)
RBC # BLD: 4.18 M/UL — SIGNIFICANT CHANGE UP (ref 3.8–5.2)
RBC # FLD: 12.8 % — SIGNIFICANT CHANGE UP (ref 10.3–14.5)
WBC # BLD: 6.83 K/UL — SIGNIFICANT CHANGE UP (ref 3.8–10.5)
WBC # FLD AUTO: 6.83 K/UL — SIGNIFICANT CHANGE UP (ref 3.8–10.5)

## 2020-11-04 PROCEDURE — 99215 OFFICE O/P EST HI 40 MIN: CPT

## 2020-11-04 PROCEDURE — 99072 ADDL SUPL MATRL&STAF TM PHE: CPT

## 2020-11-05 ENCOUNTER — NON-APPOINTMENT (OUTPATIENT)
Age: 35
End: 2020-11-05

## 2020-11-06 ENCOUNTER — NON-APPOINTMENT (OUTPATIENT)
Age: 35
End: 2020-11-06

## 2020-11-06 PROBLEM — C50.919 MALIGNANT NEOPLASM OF BREAST: Status: ACTIVE | Noted: 2020-10-28

## 2020-11-06 PROBLEM — N63.20 LEFT BREAST LUMP: Status: ACTIVE | Noted: 2020-09-04

## 2020-11-09 PROBLEM — R59.0 LYMPHADENOPATHY, AXILLARY: Status: ACTIVE | Noted: 2020-10-28

## 2020-11-09 PROBLEM — N63.20 BREAST MASS, LEFT: Status: ACTIVE | Noted: 2020-09-18

## 2020-11-13 DIAGNOSIS — Z45.2 ENCOUNTER FOR ADJUSTMENT AND MANAGEMENT OF VASCULAR ACCESS DEVICE: ICD-10-CM

## 2020-11-13 DIAGNOSIS — C50.919 MALIGNANT NEOPLASM OF UNSPECIFIED SITE OF UNSPECIFIED FEMALE BREAST: ICD-10-CM

## 2020-11-15 ENCOUNTER — OUTPATIENT (OUTPATIENT)
Dept: OUTPATIENT SERVICES | Facility: HOSPITAL | Age: 35
LOS: 1 days | End: 2020-11-15
Payer: COMMERCIAL

## 2020-11-15 ENCOUNTER — APPOINTMENT (OUTPATIENT)
Dept: MRI IMAGING | Facility: IMAGING CENTER | Age: 35
End: 2020-11-15
Payer: COMMERCIAL

## 2020-11-15 DIAGNOSIS — Z00.8 ENCOUNTER FOR OTHER GENERAL EXAMINATION: ICD-10-CM

## 2020-11-15 DIAGNOSIS — N75.0 CYST OF BARTHOLIN'S GLAND: Chronic | ICD-10-CM

## 2020-11-15 PROCEDURE — 70553 MRI BRAIN STEM W/O & W/DYE: CPT | Mod: 26

## 2020-11-15 PROCEDURE — A9585: CPT

## 2020-11-15 PROCEDURE — 70553 MRI BRAIN STEM W/O & W/DYE: CPT

## 2020-11-15 NOTE — ASSESSMENT
[Curative] : Goals of care discussed with patient: Curative [FreeTextEntry1] : Ms. RIANNA HERNANDEZ is a 35 year old premenopausal female who is diagnosed with clinically T2, N1, M0 stage IIB ER 10%, OH negative, HER-2/stella negative left breast poorly differentiated invasive ductal carcinoma.  CT scan chest abdomen pelvis and a bone scan is negative for distant metastases 10/2020. Declined egg cryopreservation. BRCA neg\par \par - Breast ca:  Started neoadjuvant ddACT chemo 11/4/2020. Counts good. AC # 1 today. \par - Chemo induced anemia- Baseline Hgb WNL  Grade. No transfusion needed. Monitor counts\par - Mild leukocytosis possible secondary to ONPRO. No neutropenia or thrombocytopenia. Counts ok to proceed with Cycle 1 today. \par - Chemo induced mucositis- Use ice chips during bria infusion to prevent mucositis. miracle mouth wash or Carafate prn.  \par - Chemo induced diarrhea-  Imodium PRN. Maintain PO hydration. BRAT diet\par - Chemo induced N/V- Will get premedications with Emend, dexamethasone and Aloxi. She will continue dexamethasone for next 3 days for antinausea prophylaxis. She will use Reglan as needed.\par - GF induced bone pain- She will take Claritin. \par - Chemo induced dysgeusia and fatigue: Encourage p.o. fluids, small frequent meals. \par - Chemo induced neuropathy- monitor\par - Alopecia- prescription for wig given.\par - Instructed to call office and go directly to the emergency room with fever more than 100.4, shaking chills, productive cough, sore throat, shortness of breath or urinary symptoms. Patient verbalized understanding and agreement.\par - Emotional support provided, all questions answered.\par - She will undergo surgery after neoadjuvant chemo. Pathologic response will guide further treatment.  If she has residual disease, we will recheck ER/OH/HER-2.  Although she is ER 10% positive, aggressiveness of her cancer is more indicative of triple negative/basal subtype.  Given low ER positive disease, endocrine therapy will be considered.  She is a candidate for radiation therapy as well.\par - Brain MRI pending\par \par RTO 2 weeks\par D/w and seen in collaboration with Dr. Donya Ayoub\par

## 2020-11-15 NOTE — PHYSICAL EXAM
[Fully active, able to carry on all pre-disease performance without restriction] : Status 0 - Fully active, able to carry on all pre-disease performance without restriction [Normal] : affect appropriate [de-identified] : port in the right chest wall- cdi [de-identified] : left breast: visible lump at 10:00. no skin changes or nipple retraction. On palpation- 5 cm hard, non tender, mobile mass at 10:00. + palpable left axillary LN. No abnormal right breast findings. No palpable cervical LN

## 2020-11-15 NOTE — CONSULT LETTER
[Dear  ___] : Dear  [unfilled], [Consult Letter:] : I had the pleasure of evaluating your patient, [unfilled]. [Please see my note below.] : Please see my note below. [Consult Closing:] : Thank you very much for allowing me to participate in the care of this patient.  If you have any questions, please do not hesitate to contact me. [Sincerely,] : Sincerely, [DrLv  ___] : Dr. LOPES [DrLv ___] : Dr. LOPES [FreeTextEntry3] : Donya Ayoub MD\par Division of Medical Oncology and Hematology\par [facility] [place]\par [employer] [facility]\par [place], [place]

## 2020-11-15 NOTE — HISTORY OF PRESENT ILLNESS
[T: ___] : T[unfilled] [N: ___] : N[unfilled] [M: ___] : M[unfilled] [AJCC Stage: ____] : AJCC Stage: [unfilled] [Date: ____________] : Patient's last distress assessment performed on [unfilled]. [4 - Distress Level] : Distress Level: 4 [de-identified] : Ms.Ana López is a 35 year old female here for an evaluation of breast cancer. Her oncologic history is as follows:\par \par Patient felt a palpable lump in 8/2020. She underwent breast imaging on 10/8/2020 BIRADS 4B which showed  a 3.2 cm mass in the central upper slightly inner left breast and a lobulated solid mass with cystic components measuring 2.6 x 2.5 x 3.3 cm. There is also a smaller anterior mass measuring 7mm. US showed a large lobulated mass in the central upper inner left breast at the 10:00 axis 2 cm FN.  She underwent a left breast 10 o'clock, 2 cm FN, ultrasound guided core biopsy on 10/16/2020 which showed invasive poorly differentiated ductal carcinoma with focal central necrosis, [place] score 8/9 measuring least 0.8 cm, ER Positive, 10%, PgR Negative, 0%, HER-2 Negative\par \par She underwent a breast MRI on 10/21/2020 BIRADS 6 which showed  left breast malignancy, a dominant mass in the left inner breast, measuring 5.0 cm. There are multiple surrounding satellite masses. The majority of the upper inner and central breast demonstrates disease. The dominant mass and associated satellites measure approximately 6.5 cm transverse x 8.5 cm AP x  4.5 cm sagittal in overall greatest dimension. There is associated parenchymal edema and skin thickening involving the inner breast. There is also nonspecific enhancement of the left nipple. There are multiple matted lymph nodes in the left axilla. No evidence of internal mammary adenopathy. A nonenhancing cystic mass in the left anterior chest/mediastinum was also noted. \par \par 10/27/2020 NM BONE SCAN :No scan evidence of osseous metastasis.\par 9/4/2020 EKG NSR \par CT 10/27/2020 MARI, + epicardial cyst\par \par She has been  x 10 years.  is a  in [place]. Her family is in [place]. No children. She lives in [facility] and considering moving to [place]. \par \par Genetic testing sent 10/26/2020\par \par  [de-identified] : Ms. RIANNA HERNANDEZ  is here for a follow up appt for left breast cancer dx in 10/2020 and started ddAC and Onpro X 4 cycles on 11/4/2020 followed by weekly Taxol X 12 She will also start monthly Lupron today 11/4/2020\par Here to start Cycle 1 AC 11/4/2020 Counts good\par Chemo s/e discussed\par Medication for symptom management reviewed and questions answered\par 10/29/2020 ECHO LVEF 60%\par 10/27/2020 CT CAP: MARI Left epicardial cyst\par 10/27/2020 Bone scan , MARI\par 9/4/2020 EKG done , Chemo consent obtained \par 11/02/2020 right side Medport in place, catheter tip at the distal SVC\par Patient is a anxious about stating chemo today\par Emotional support given\par \par

## 2020-11-18 ENCOUNTER — RESULT REVIEW (OUTPATIENT)
Age: 35
End: 2020-11-18

## 2020-11-18 ENCOUNTER — APPOINTMENT (OUTPATIENT)
Dept: INFUSION THERAPY | Facility: HOSPITAL | Age: 35
End: 2020-11-18

## 2020-11-18 ENCOUNTER — LABORATORY RESULT (OUTPATIENT)
Age: 35
End: 2020-11-18

## 2020-11-18 ENCOUNTER — APPOINTMENT (OUTPATIENT)
Dept: HEMATOLOGY ONCOLOGY | Facility: CLINIC | Age: 35
End: 2020-11-18
Payer: COMMERCIAL

## 2020-11-18 VITALS
RESPIRATION RATE: 16 BRPM | OXYGEN SATURATION: 98 % | DIASTOLIC BLOOD PRESSURE: 67 MMHG | TEMPERATURE: 98.2 F | WEIGHT: 145.51 LBS | HEART RATE: 83 BPM | SYSTOLIC BLOOD PRESSURE: 109 MMHG | HEIGHT: 66.85 IN | BODY MASS INDEX: 22.84 KG/M2

## 2020-11-18 LAB
BASOPHILS # BLD AUTO: 0 K/UL — SIGNIFICANT CHANGE UP (ref 0–0.2)
BASOPHILS NFR BLD AUTO: 0 % — SIGNIFICANT CHANGE UP (ref 0–2)
EOSINOPHIL # BLD AUTO: 0 K/UL — SIGNIFICANT CHANGE UP (ref 0–0.5)
EOSINOPHIL NFR BLD AUTO: 0 % — SIGNIFICANT CHANGE UP (ref 0–6)
HCT VFR BLD CALC: 34 % — LOW (ref 34.5–45)
HGB BLD-MCNC: 11.5 G/DL — SIGNIFICANT CHANGE UP (ref 11.5–15.5)
LYMPHOCYTES # BLD AUTO: 1.39 K/UL — SIGNIFICANT CHANGE UP (ref 1–3.3)
LYMPHOCYTES # BLD AUTO: 15 % — SIGNIFICANT CHANGE UP (ref 13–44)
MCHC RBC-ENTMCNC: 29.8 PG — SIGNIFICANT CHANGE UP (ref 27–34)
MCHC RBC-ENTMCNC: 33.8 G/DL — SIGNIFICANT CHANGE UP (ref 32–36)
MCV RBC AUTO: 88.1 FL — SIGNIFICANT CHANGE UP (ref 80–100)
MONOCYTES # BLD AUTO: 0.74 K/UL — SIGNIFICANT CHANGE UP (ref 0–0.9)
MONOCYTES NFR BLD AUTO: 8 % — SIGNIFICANT CHANGE UP (ref 2–14)
NEUTROPHILS # BLD AUTO: 7.11 K/UL — SIGNIFICANT CHANGE UP (ref 1.8–7.4)
NEUTROPHILS NFR BLD AUTO: 76 % — SIGNIFICANT CHANGE UP (ref 43–77)
NEUTS BAND # BLD: 1 % — SIGNIFICANT CHANGE UP (ref 0–8)
NRBC # BLD: 0 /100 — SIGNIFICANT CHANGE UP (ref 0–0)
NRBC # BLD: SIGNIFICANT CHANGE UP /100 WBCS (ref 0–0)
PLAT MORPH BLD: NORMAL — SIGNIFICANT CHANGE UP
PLATELET # BLD AUTO: 197 K/UL — SIGNIFICANT CHANGE UP (ref 150–400)
RBC # BLD: 3.86 M/UL — SIGNIFICANT CHANGE UP (ref 3.8–5.2)
RBC # FLD: 12.8 % — SIGNIFICANT CHANGE UP (ref 10.3–14.5)
RBC BLD AUTO: SIGNIFICANT CHANGE UP
WBC # BLD: 9.24 K/UL — SIGNIFICANT CHANGE UP (ref 3.8–10.5)
WBC # FLD AUTO: 9.24 K/UL — SIGNIFICANT CHANGE UP (ref 3.8–10.5)

## 2020-11-18 PROCEDURE — 99215 OFFICE O/P EST HI 40 MIN: CPT

## 2020-11-18 NOTE — PHYSICAL EXAM
[Fully active, able to carry on all pre-disease performance without restriction] : Status 0 - Fully active, able to carry on all pre-disease performance without restriction [Normal] : affect appropriate [de-identified] : port in the right chest wall- cdi [de-identified] : left breast: mass approx 2 cm but not easily palpable. No skin changes. Axillary node not palpable. (((NITIAL PRESENTATION: visible lump at 10:00. no skin changes or nipple retraction. On palpation- 5 cm hard, non tender, mobile mass at 10:00. + palpable left axillary LN. No abnormal right breast findings. No palpable cervical LN)))

## 2020-11-18 NOTE — HISTORY OF PRESENT ILLNESS
[T: ___] : T[unfilled] [N: ___] : N[unfilled] [M: ___] : M[unfilled] [AJCC Stage: ____] : AJCC Stage: [unfilled] [Date: ____________] : Patient's last distress assessment performed on [unfilled]. [4 - Distress Level] : Distress Level: 4 [de-identified] : Ms.Ana López is a 35 year old female here for an evaluation of breast cancer. Her oncologic history is as follows:\par \par Patient felt a palpable lump in 8/2020. She underwent breast imaging on 10/8/2020 BIRADS 4B which showed  a 3.2 cm mass in the central upper slightly inner left breast and a lobulated solid mass with cystic components measuring 2.6 x 2.5 x 3.3 cm. There is also a smaller anterior mass measuring 7mm. US showed a large lobulated mass in the central upper inner left breast at the 10:00 axis 2 cm FN.  She underwent a left breast 10 o'clock, 2 cm FN, ultrasound guided core biopsy on 10/16/2020 which showed invasive poorly differentiated ductal carcinoma with focal central necrosis, [place] score 8/9 measuring least 0.8 cm, ER Positive, 10%, PgR Negative, 0%, HER-2 Negative\par \par She underwent a breast MRI on 10/21/2020 BIRADS 6 which showed  left breast malignancy, a dominant mass in the left inner breast, measuring 5.0 cm. There are multiple surrounding satellite masses. The majority of the upper inner and central breast demonstrates disease. The dominant mass and associated satellites measure approximately 6.5 cm transverse x 8.5 cm AP x  4.5 cm sagittal in overall greatest dimension. There is associated parenchymal edema and skin thickening involving the inner breast. There is also nonspecific enhancement of the left nipple. There are multiple matted lymph nodes in the left axilla. No evidence of internal mammary adenopathy. A nonenhancing cystic mass in the left anterior chest/mediastinum was also noted. \par \par 10/27/2020 NM BONE SCAN :No scan evidence of osseous metastasis.\par 9/4/2020 EKG NSR \par CT 10/27/2020 MARI, + epicardial cyst\par \par She has been  x 10 years.  is a  in [place]. Her family is in [place]. No children. She lives in [facility] and considering moving to [place]. \par \par Genetic testing sent 10/26/2020\par \par 11/4/2020\par Here to start Cycle 1 AC 11/4/2020 Counts good\par Chemo s/e discussed\par Medication for symptom management reviewed and questions answered\par 10/29/2020 ECHO LVEF 60%\par 10/27/2020 CT CAP: MARI Left epicardial cyst\par 10/27/2020 Bone scan , MARI\par 9/4/2020 EKG done , Chemo consent obtained \par 11/02/2020 right side Medport in place, catheter tip at the distal SVC\par Patient is a anxious about stating chemo today\par Emotional support given\par \par  [de-identified] : Ms. RIANNA HERNANDEZ  is here for a follow up appt for left breast cancer dx in 10/2020 and started ddAC and Onpro X 4 cycles on 11/4/2020 followed by weekly Taxol X 12 Monthly Lupron started 11/4/2020\par Cycle 2 AC today\par She reports mild-moderate fatigue and altered taste after chemo. She was able to function, maintained PO intake, weight stable. She had mild nausea, took Reglan, no vomiting,  or mouth sores. Had episode of diarrhea on day 10. Ate banana and sx resolved. Mild bone pain, no neuropathy, no fevers\par LMP 11/7/2020. Had mild hot flashes after lupron\par breast mass shrinking, not palpable after C1\par Planning to move to [place] after ACX4

## 2020-11-18 NOTE — ASSESSMENT
[Curative] : Goals of care discussed with patient: Curative [FreeTextEntry1] : Ms. RIANNA HERNANDEZ is a 35 year old premenopausal female who is diagnosed with clinically T2, N1, M0 stage IIB ER 10%, UT negative, HER-2/stella negative left breast poorly differentiated invasive ductal carcinoma.  CT scan chest abdomen pelvis and a bone scan is negative for distant metastases 10/2020. Declined egg cryopreservation. BRCA neg\par \par - Breast ca:  Started neoadjuvant ddACT chemo 11/4/2020. Counts good. Good response to treatment. AC # 2 today. \par - Chemo induced anemia- Baseline Hgb WNL  Grade. No transfusion needed. Monitor counts\par - Mild leukocytosis possible secondary to ONPRO. No neutropenia or thrombocytopenia. Counts ok to proceed with Cycle 2 today. \par - Chemo induced mucositis- Use ice chips during bria infusion to prevent mucositis. miracle mouth wash or Carafate prn.  \par - Chemo induced diarrhea-  Imodium PRN. Maintain PO hydration. BRAT diet\par - Chemo induced N/V- Will get premedications with Emend, dexamethasone and Aloxi. She will continue dexamethasone for next 3 days for antinausea prophylaxis. She will use Reglan as needed.\par - GF induced bone pain- She will take Claritin. \par - Chemo induced dysgeusia and fatigue: Encourage p.o. fluids, small frequent meals. \par - Chemo induced neuropathy- monitor\par - Alopecia- prescription for wig given.\par - Instructed to call office and go directly to the emergency room with fever more than 100.4, shaking chills, productive cough, sore throat, shortness of breath or urinary symptoms. Patient verbalized understanding and agreement.\par - Emotional support provided, all questions answered.\par - She will undergo surgery after neoadjuvant chemo. Pathologic response will guide further treatment.  If she has residual disease, we will recheck ER/UT/HER-2.  Although she is ER 10% positive, aggressiveness of her cancer is more indicative of triple negative/basal subtype.  Given low ER positive disease, endocrine therapy will be considered.  She is a candidate for radiation therapy as well.\par - Brain MRI MARI\par \par RTO 2 weeks

## 2020-11-20 ENCOUNTER — OUTPATIENT (OUTPATIENT)
Dept: OUTPATIENT SERVICES | Facility: HOSPITAL | Age: 35
LOS: 1 days | End: 2020-11-20
Payer: COMMERCIAL

## 2020-11-20 DIAGNOSIS — Z11.59 ENCOUNTER FOR SCREENING FOR OTHER VIRAL DISEASES: ICD-10-CM

## 2020-11-20 DIAGNOSIS — N75.0 CYST OF BARTHOLIN'S GLAND: Chronic | ICD-10-CM

## 2020-11-20 LAB — SARS-COV-2 RNA SPEC QL NAA+PROBE: SIGNIFICANT CHANGE UP

## 2020-11-20 PROCEDURE — U0003: CPT

## 2020-11-28 ENCOUNTER — OUTPATIENT (OUTPATIENT)
Dept: OUTPATIENT SERVICES | Facility: HOSPITAL | Age: 35
LOS: 1 days | Discharge: HOME | End: 2020-11-28

## 2020-11-28 DIAGNOSIS — C50.919 MALIGNANT NEOPLASM OF UNSPECIFIED SITE OF UNSPECIFIED FEMALE BREAST: ICD-10-CM

## 2020-11-28 DIAGNOSIS — N75.0 CYST OF BARTHOLIN'S GLAND: Chronic | ICD-10-CM

## 2020-12-02 ENCOUNTER — RESULT REVIEW (OUTPATIENT)
Age: 35
End: 2020-12-02

## 2020-12-02 ENCOUNTER — LABORATORY RESULT (OUTPATIENT)
Age: 35
End: 2020-12-02

## 2020-12-02 ENCOUNTER — APPOINTMENT (OUTPATIENT)
Dept: HEMATOLOGY ONCOLOGY | Facility: CLINIC | Age: 35
End: 2020-12-02
Payer: COMMERCIAL

## 2020-12-02 ENCOUNTER — APPOINTMENT (OUTPATIENT)
Dept: INFUSION THERAPY | Facility: HOSPITAL | Age: 35
End: 2020-12-02

## 2020-12-02 VITALS
RESPIRATION RATE: 17 BRPM | TEMPERATURE: 98.1 F | OXYGEN SATURATION: 99 % | DIASTOLIC BLOOD PRESSURE: 71 MMHG | SYSTOLIC BLOOD PRESSURE: 104 MMHG | HEIGHT: 66.85 IN | HEART RATE: 82 BPM | WEIGHT: 144.4 LBS | BODY MASS INDEX: 22.66 KG/M2

## 2020-12-02 DIAGNOSIS — R53.83 OTHER FATIGUE: ICD-10-CM

## 2020-12-02 DIAGNOSIS — R11.2 NAUSEA WITH VOMITING, UNSPECIFIED: ICD-10-CM

## 2020-12-02 DIAGNOSIS — Z51.11 ENCOUNTER FOR ANTINEOPLASTIC CHEMOTHERAPY: ICD-10-CM

## 2020-12-02 DIAGNOSIS — Z79.899 OTHER LONG TERM (CURRENT) DRUG THERAPY: ICD-10-CM

## 2020-12-02 DIAGNOSIS — Z51.89 ENCOUNTER FOR OTHER SPECIFIED AFTERCARE: ICD-10-CM

## 2020-12-02 LAB
BASOPHILS # BLD AUTO: 0.1 K/UL — SIGNIFICANT CHANGE UP (ref 0–0.2)
BASOPHILS NFR BLD AUTO: 1 % — SIGNIFICANT CHANGE UP (ref 0–2)
EOSINOPHIL # BLD AUTO: 0 K/UL — SIGNIFICANT CHANGE UP (ref 0–0.5)
EOSINOPHIL NFR BLD AUTO: 0 % — SIGNIFICANT CHANGE UP (ref 0–6)
HCT VFR BLD CALC: 33.4 % — LOW (ref 34.5–45)
HGB BLD-MCNC: 11 G/DL — LOW (ref 11.5–15.5)
LYMPHOCYTES # BLD AUTO: 0.96 K/UL — LOW (ref 1–3.3)
LYMPHOCYTES # BLD AUTO: 10 % — LOW (ref 13–44)
MCHC RBC-ENTMCNC: 29.1 PG — SIGNIFICANT CHANGE UP (ref 27–34)
MCHC RBC-ENTMCNC: 32.9 G/DL — SIGNIFICANT CHANGE UP (ref 32–36)
MCV RBC AUTO: 88.4 FL — SIGNIFICANT CHANGE UP (ref 80–100)
METAMYELOCYTES # FLD: 1 % — HIGH (ref 0–0)
MONOCYTES # BLD AUTO: 1.06 K/UL — HIGH (ref 0–0.9)
MONOCYTES NFR BLD AUTO: 11 % — SIGNIFICANT CHANGE UP (ref 2–14)
MYELOCYTES NFR BLD: 1 % — HIGH (ref 0–0)
NEUTROPHILS # BLD AUTO: 7.31 K/UL — SIGNIFICANT CHANGE UP (ref 1.8–7.4)
NEUTROPHILS NFR BLD AUTO: 75 % — SIGNIFICANT CHANGE UP (ref 43–77)
NEUTS BAND # BLD: 1 % — SIGNIFICANT CHANGE UP (ref 0–8)
NRBC # BLD: 0 /100 — SIGNIFICANT CHANGE UP (ref 0–0)
NRBC # BLD: SIGNIFICANT CHANGE UP /100 WBCS (ref 0–0)
PLAT MORPH BLD: NORMAL — SIGNIFICANT CHANGE UP
PLATELET # BLD AUTO: 175 K/UL — SIGNIFICANT CHANGE UP (ref 150–400)
RBC # BLD: 3.78 M/UL — LOW (ref 3.8–5.2)
RBC # FLD: 13 % — SIGNIFICANT CHANGE UP (ref 10.3–14.5)
RBC BLD AUTO: SIGNIFICANT CHANGE UP
WBC # BLD: 9.62 K/UL — SIGNIFICANT CHANGE UP (ref 3.8–10.5)
WBC # FLD AUTO: 9.62 K/UL — SIGNIFICANT CHANGE UP (ref 3.8–10.5)

## 2020-12-02 PROCEDURE — 99215 OFFICE O/P EST HI 40 MIN: CPT

## 2020-12-02 PROCEDURE — 99072 ADDL SUPL MATRL&STAF TM PHE: CPT

## 2020-12-16 ENCOUNTER — RESULT REVIEW (OUTPATIENT)
Age: 35
End: 2020-12-16

## 2020-12-16 ENCOUNTER — LABORATORY RESULT (OUTPATIENT)
Age: 35
End: 2020-12-16

## 2020-12-16 ENCOUNTER — APPOINTMENT (OUTPATIENT)
Dept: INFUSION THERAPY | Facility: HOSPITAL | Age: 35
End: 2020-12-16

## 2020-12-16 ENCOUNTER — APPOINTMENT (OUTPATIENT)
Dept: HEMATOLOGY ONCOLOGY | Facility: CLINIC | Age: 35
End: 2020-12-16

## 2020-12-16 ENCOUNTER — APPOINTMENT (OUTPATIENT)
Dept: HEMATOLOGY ONCOLOGY | Facility: CLINIC | Age: 35
End: 2020-12-16
Payer: COMMERCIAL

## 2020-12-16 DIAGNOSIS — Z91.89 OTHER SPECIFIED PERSONAL RISK FACTORS, NOT ELSEWHERE CLASSIFIED: ICD-10-CM

## 2020-12-16 DIAGNOSIS — T45.1X5A TOXIC GASTROENTERITIS AND COLITIS: ICD-10-CM

## 2020-12-16 DIAGNOSIS — R79.89 OTHER SPECIFIED ABNORMAL FINDINGS OF BLOOD CHEMISTRY: ICD-10-CM

## 2020-12-16 DIAGNOSIS — T45.1X5A ANEMIA DUE TO ANTINEOPLASTIC CHEMOTHERAPY: ICD-10-CM

## 2020-12-16 DIAGNOSIS — K52.1 TOXIC GASTROENTERITIS AND COLITIS: ICD-10-CM

## 2020-12-16 DIAGNOSIS — G89.18 PAIN IN UNSPECIFIED JOINT: ICD-10-CM

## 2020-12-16 DIAGNOSIS — T45.1X5A NAUSEA: ICD-10-CM

## 2020-12-16 DIAGNOSIS — R11.0 NAUSEA: ICD-10-CM

## 2020-12-16 DIAGNOSIS — D64.81 ANEMIA DUE TO ANTINEOPLASTIC CHEMOTHERAPY: ICD-10-CM

## 2020-12-16 DIAGNOSIS — R43.2 PARAGEUSIA: ICD-10-CM

## 2020-12-16 DIAGNOSIS — M25.50 PAIN IN UNSPECIFIED JOINT: ICD-10-CM

## 2020-12-16 DIAGNOSIS — Z51.11 ENCOUNTER FOR ANTINEOPLASTIC CHEMOTHERAPY: ICD-10-CM

## 2020-12-16 LAB
BASOPHILS # BLD AUTO: 0.11 K/UL — SIGNIFICANT CHANGE UP (ref 0–0.2)
BASOPHILS NFR BLD AUTO: 1 % — SIGNIFICANT CHANGE UP (ref 0–2)
EOSINOPHIL # BLD AUTO: 0.11 K/UL — SIGNIFICANT CHANGE UP (ref 0–0.5)
EOSINOPHIL NFR BLD AUTO: 1 % — SIGNIFICANT CHANGE UP (ref 0–6)
HCT VFR BLD CALC: 29.3 % — LOW (ref 34.5–45)
HGB BLD-MCNC: 9.6 G/DL — LOW (ref 11.5–15.5)
LYMPHOCYTES # BLD AUTO: 1.06 K/UL — SIGNIFICANT CHANGE UP (ref 1–3.3)
LYMPHOCYTES # BLD AUTO: 10 % — LOW (ref 13–44)
MCHC RBC-ENTMCNC: 29 PG — SIGNIFICANT CHANGE UP (ref 27–34)
MCHC RBC-ENTMCNC: 32.8 G/DL — SIGNIFICANT CHANGE UP (ref 32–36)
MCV RBC AUTO: 88.5 FL — SIGNIFICANT CHANGE UP (ref 80–100)
METAMYELOCYTES # FLD: 1 % — HIGH (ref 0–0)
MONOCYTES # BLD AUTO: 1.38 K/UL — HIGH (ref 0–0.9)
MONOCYTES NFR BLD AUTO: 13 % — SIGNIFICANT CHANGE UP (ref 2–14)
MYELOCYTES NFR BLD: 1 % — HIGH (ref 0–0)
NEUTROPHILS # BLD AUTO: 7.75 K/UL — HIGH (ref 1.8–7.4)
NEUTROPHILS NFR BLD AUTO: 73 % — SIGNIFICANT CHANGE UP (ref 43–77)
NRBC # BLD: 1 /100 — HIGH (ref 0–0)
NRBC # BLD: SIGNIFICANT CHANGE UP /100 WBCS (ref 0–0)
PLAT MORPH BLD: NORMAL — SIGNIFICANT CHANGE UP
PLATELET # BLD AUTO: 219 K/UL — SIGNIFICANT CHANGE UP (ref 150–400)
RBC # BLD: 3.31 M/UL — LOW (ref 3.8–5.2)
RBC # FLD: 14 % — SIGNIFICANT CHANGE UP (ref 10.3–14.5)
RBC BLD AUTO: SIGNIFICANT CHANGE UP
WBC # BLD: 10.62 K/UL — HIGH (ref 3.8–10.5)
WBC # FLD AUTO: 10.62 K/UL — HIGH (ref 3.8–10.5)

## 2020-12-16 PROCEDURE — 99072 ADDL SUPL MATRL&STAF TM PHE: CPT

## 2020-12-16 PROCEDURE — 99215 OFFICE O/P EST HI 40 MIN: CPT

## 2020-12-16 RX ORDER — OMEPRAZOLE 40 MG/1
40 CAPSULE, DELAYED RELEASE ORAL
Qty: 30 | Refills: 4 | Status: ACTIVE | COMMUNITY
Start: 2020-12-16 | End: 1900-01-01

## 2020-12-16 RX ORDER — DEXAMETHASONE 4 MG/1
4 TABLET ORAL
Qty: 10 | Refills: 0 | Status: ACTIVE | COMMUNITY
Start: 2020-12-16 | End: 1900-01-01

## 2020-12-16 NOTE — PHYSICAL EXAM
[Fully active, able to carry on all pre-disease performance without restriction] : Status 0 - Fully active, able to carry on all pre-disease performance without restriction [Normal] : affect appropriate [de-identified] : port in the right chest wall- cdi slight opening to suture site no s/s of infection noted  [de-identified] : left breast: mass approx 2 cm but not easily palpable. No skin changes. Axillary node not palpable. (((NITIAL PRESENTATION: visible lump at 10:00. no skin changes or nipple retraction. On palpation- 5 cm hard, non tender, mobile mass at 10:00. + palpable left axillary LN. No abnormal right breast findings. No palpable cervical LN)))

## 2020-12-16 NOTE — ASSESSMENT
[Curative] : Goals of care discussed with patient: Curative [FreeTextEntry1] : Ms. RIANNA HERNANDEZ is a 35 year old premenopausal female who is diagnosed with clinically T2, N1, M0 stage IIB ER 10%, GA negative, HER-2/stella negative left breast poorly differentiated invasive ductal carcinoma.  CT scan chest abdomen pelvis and a bone scan is negative for distant metastases 10/2020. Declined egg cryopreservation. BRCA neg\par \par - Breast ca:  Started neoadjuvant ddACT chemo 11/4/2020. Counts good. Good response to treatment. AC# 4 today. \par - Planning to move to [place] after ACX4. SHe has family in [place] and wants to get weekly taxol there\par Discussed taxol and lupron schedule. She is seeing med onc on 12/22 and will start taxol/lupron on 12/30. Travelling by car and DVT precautions d/w her. She plans to come back in March for breast MRI and surgery. Will repeat markers if she has residual disease. Premeds prescribed as she is worried about copay etc in [place]. Her family in [place] are getting covid test and will take all appropriate precautions \par - Chemo induced anemia- Baseline Hgb WNL  Grade 2. No transfusion needed. Monitor counts\par - Mild leukocytosis possible secondary to ONPRO. No neutropenia or thrombocytopenia. Counts ok to proceed with Cycle 4 today. \par - Chemo induced mucositis- Use ice chips during bria infusion to prevent mucositis. miracle mouth wash or Carafate prn.  \par - Chemo induced diarrhea-  Imodium PRN. Maintain PO hydration. BRAT diet\par - Chemo induced N/V- Will get premedications with Emend, dexamethasone and Aloxi. She will continue dexamethasone for next 3 days for antinausea prophylaxis. She will use Reglan as needed.\par - GF induced bone pain- She will take Claritin. \par - Chemo induced dysgeusia and fatigue: Encourage p.o. fluids, small frequent meals. \par - Chemo induced neuropathy- monitor\par - Alopecia- prescription for wig given.\par - Instructed to call office and go directly to the emergency room with fever more than 100.4, shaking chills, productive cough, sore throat, shortness of breath or urinary symptoms. Patient verbalized understanding and agreement.\par - Emotional support provided, all questions answered.\par - She will undergo surgery after neoadjuvant chemo. Pathologic response will guide further treatment.  If she has residual disease, we will recheck ER/GA/HER-2.  Although she is ER 10% positive, aggressiveness of her cancer is more indicative of triple negative/basal subtype.  Given low ER positive disease, endocrine therapy will be considered.  She is a candidate for radiation therapy as well.\par - Brain MRI MARI\par \par RTO after 3 m of taxol in [place]\par plan for surgery with Dr Ca and f/u with me 2-3 weeks post op

## 2020-12-16 NOTE — HISTORY OF PRESENT ILLNESS
[T: ___] : T[unfilled] [N: ___] : N[unfilled] [M: ___] : M[unfilled] [AJCC Stage: ____] : AJCC Stage: [unfilled] [Date: ____________] : Patient's last distress assessment performed on [unfilled]. [4 - Distress Level] : Distress Level: 4 [de-identified] : Ms.Ana López is a 35 year old female here for an evaluation of breast cancer. Her oncologic history is as follows:\par \par Patient felt a palpable lump in 8/2020. She underwent breast imaging on 10/8/2020 BIRADS 4B which showed  a 3.2 cm mass in the central upper slightly inner left breast and a lobulated solid mass with cystic components measuring 2.6 x 2.5 x 3.3 cm. There is also a smaller anterior mass measuring 7mm. US showed a large lobulated mass in the central upper inner left breast at the 10:00 axis 2 cm FN.  She underwent a left breast 10 o'clock, 2 cm FN, ultrasound guided core biopsy on 10/16/2020 which showed invasive poorly differentiated ductal carcinoma with focal central necrosis, [place] score 8/9 measuring least 0.8 cm, ER Positive, 10%, PgR Negative, 0%, HER-2 Negative\par \par She underwent a breast MRI on 10/21/2020 BIRADS 6 which showed  left breast malignancy, a dominant mass in the left inner breast, measuring 5.0 cm. There are multiple surrounding satellite masses. The majority of the upper inner and central breast demonstrates disease. The dominant mass and associated satellites measure approximately 6.5 cm transverse x 8.5 cm AP x  4.5 cm sagittal in overall greatest dimension. There is associated parenchymal edema and skin thickening involving the inner breast. There is also nonspecific enhancement of the left nipple. There are multiple matted lymph nodes in the left axilla. No evidence of internal mammary adenopathy. A nonenhancing cystic mass in the left anterior chest/mediastinum was also noted. \par \par 10/27/2020 NM BONE SCAN :No scan evidence of osseous metastasis.\par 9/4/2020 EKG NSR \par CT 10/27/2020 MARI, + epicardial cyst\par \par She has been  x 10 years.  is a  in [place]. Her family is in [place]. No children. She lives in [facility] and considering moving to [place]. \par \par Genetic testing sent 10/26/2020\par \par 11/4/2020\par Here to start Cycle 1 AC 11/4/2020 Counts good\par Chemo s/e discussed\par Medication for symptom management reviewed and questions answered\par 10/29/2020 ECHO LVEF 60%\par 10/27/2020 CT CAP: MARI Left epicardial cyst\par 10/27/2020 Bone scan , MARI\par 9/4/2020 EKG done , Chemo consent obtained \par 11/02/2020 right side Medport in place, catheter tip at the distal SVC\par Patient is a anxious about stating chemo today\par Emotional support given\par \par  [de-identified] : Ms. RIANNA HERNANDEZ  is here for a follow up appt for left breast cancer dx in 10/2020 and started ddAC and Onpro X 4 cycles on 11/4/2020 followed by weekly Taxol X 12 Monthly Lupron started 11/4/2020\par Cycle 4 AC today\par She reports mild-moderate fatigue and altered taste after chemo. She was able to function, maintained PO intake, weight stable. She had mild nausea, took Reglan, no vomiting,  or mouth sores. Had episode of diarrhea on day 10. Ate banana and sx resolved. Mild bone pain, no neuropathy, no fevers\par LMP 11/7/2020. Had mild hot flashes after lupron. Last lupron 12/2\par breast mass shrinking, not palpable after C1\par Planning to move to [place] after ACX4. SHe has family in [place] and wants to get weekly taxol there\par Discussed taxol and lupron schedule. She is seeing med onc on 12/22 and will start taxol/lupron on 12/30. Travelling by car and DVT precautions d/w her. She plans to come back in March for breast MRI and surgery. Will repeat markers if she has residual disease. Premeds prescribed as she is worried about copay etc in [place]

## 2020-12-17 ENCOUNTER — NON-APPOINTMENT (OUTPATIENT)
Age: 35
End: 2020-12-17

## 2021-01-05 ENCOUNTER — APPOINTMENT (OUTPATIENT)
Dept: PEDIATRIC MEDICAL GENETICS | Facility: CLINIC | Age: 36
End: 2021-01-05
Payer: COMMERCIAL

## 2021-01-05 PROCEDURE — 99202 OFFICE O/P NEW SF 15 MIN: CPT | Mod: 95

## 2021-01-09 NOTE — ASSESSMENT
[Curative] : Goals of care discussed with patient: Curative [FreeTextEntry1] : Ms. RIANNA HERNANDEZ is a 35 year old premenopausal female who is diagnosed with clinically T2, N1, M0 stage IIB ER 10%, [place] negative, HER-2/stella negative left breast poorly differentiated invasive ductal carcinoma.  CT scan chest abdomen pelvis and a bone scan is negative for distant metastases 10/2020. Declined egg cryopreservation. BRCA neg\par \par - Breast ca:  Started neoadjuvant ddACT chemo 11/4/2020. Counts good. Good response to treatment. AC#3 today. \par - Chemo induced anemia- Baseline Hgb WNL  Grade. No transfusion needed. Monitor counts\par - Mild leukocytosis possible secondary to ONPRO. No neutropenia or thrombocytopenia. Counts ok to proceed with Cycle 3 today. \par - Chemo induced mucositis- Use ice chips during bria infusion to prevent mucositis. miracle mouth wash or Carafate prn.  \par - Chemo induced diarrhea-  Imodium PRN. Maintain PO hydration. BRAT diet\par - Chemo induced N/V- Will get premedications with Emend, dexamethasone and Aloxi. She will continue dexamethasone for next 3 days for antinausea prophylaxis. She will use Reglan as needed.\par - GF induced bone pain- She will take Claritin. \par - Chemo induced dysgeusia and fatigue: Encourage p.o. fluids, small frequent meals. \par - Chemo induced neuropathy- monitor\par - Alopecia- prescription for wig given.\par - Instructed to call office and go directly to the emergency room with fever more than 100.4, shaking chills, productive cough, sore throat, shortness of breath or urinary symptoms. Patient verbalized understanding and agreement.\par - Emotional support provided, all questions answered.\par - She will undergo surgery after neoadjuvant chemo. Pathologic response will guide further treatment.  If she has residual disease, we will recheck ER/LA/HER-2.  Although she is ER 10% positive, aggressiveness of her cancer is more indicative of triple negative/basal subtype.  Given low ER positive disease, endocrine therapy will be considered.  She is a candidate for radiation therapy as well.\par - Brain MRI MARI\par \par RTO 2 weeks\par D/w and seen in collaboration with Dr. Donya Ayoub\par

## 2021-01-09 NOTE — HISTORY OF PRESENT ILLNESS
[T: ___] : T[unfilled] [N: ___] : N[unfilled] [M: ___] : M[unfilled] [AJCC Stage: ____] : AJCC Stage: [unfilled] [Date: ____________] : Patient's last distress assessment performed on [unfilled]. [4 - Distress Level] : Distress Level: 4 [de-identified] : Ms.Ana López is a 35 year old female here for an evaluation of breast cancer. Her oncologic history is as follows:\par \par Patient felt a palpable lump in 8/2020. She underwent breast imaging on 10/8/2020 BIRADS 4B which showed  a 3.2 cm mass in the central upper slightly inner left breast and a lobulated solid mass with cystic components measuring 2.6 x 2.5 x 3.3 cm. There is also a smaller anterior mass measuring 7mm. US showed a large lobulated mass in the central upper inner left breast at the 10:00 axis 2 cm FN.  She underwent a left breast 10 o'clock, 2 cm FN, ultrasound guided core biopsy on 10/16/2020 which showed invasive poorly differentiated ductal carcinoma with focal central necrosis, [place] score 8/9 measuring least 0.8 cm, ER Positive, 10%, PgR Negative, 0%, HER-2 Negative\par \par She underwent a breast MRI on 10/21/2020 BIRADS 6 which showed  left breast malignancy, a dominant mass in the left inner breast, measuring 5.0 cm. There are multiple surrounding satellite masses. The majority of the upper inner and central breast demonstrates disease. The dominant mass and associated satellites measure approximately 6.5 cm transverse x 8.5 cm AP x  4.5 cm sagittal in overall greatest dimension. There is associated parenchymal edema and skin thickening involving the inner breast. There is also nonspecific enhancement of the left nipple. There are multiple matted lymph nodes in the left axilla. No evidence of internal mammary adenopathy. A nonenhancing cystic mass in the left anterior chest/mediastinum was also noted. \par \par 10/27/2020 NM BONE SCAN :No scan evidence of osseous metastasis.\par 9/4/2020 EKG NSR \par CT 10/27/2020 MARI, + epicardial cyst\par \par She has been  x 10 years.  is a  in [place]. Her family is in [place]. No children. She lives in [facility] and considering moving to [place]. \par \par Genetic testing sent 10/26/2020\par \par 11/4/2020\par Here to start Cycle 1 AC 11/4/2020 Counts good\par Chemo s/e discussed\par Medication for symptom management reviewed and questions answered\par 10/29/2020 ECHO LVEF 60%\par 10/27/2020 CT CAP: MARI Left epicardial cyst\par 10/27/2020 Bone scan , MARI\par 9/4/2020 EKG done , Chemo consent obtained \par 11/02/2020 right side Medport in place, catheter tip at the distal SVC\par Patient is a anxious about stating chemo today\par Emotional support given\par \par  [de-identified] : Ms. RIANNA HERNANDEZ  is here for a follow up appt for left breast cancer dx in 10/2020 and started ddAC and Onpro X 4 cycles on 11/4/2020 followed by weekly Taxol X 12 Monthly Lupron started 11/4/2020\par Cycle 3 AC today\par She reports mild-moderate fatigue and altered taste after chemo. She was able to function, maintained PO intake, weight stable. She had mild nausea, took Reglan, no vomiting,  or mouth sores. Had episode of diarrhea on day 10. Ate banana and sx resolved. Mild bone pain, no neuropathy, no fevers\par LMP 11/7/2020. Had mild hot flashes after lupron\par breast mass shrinking, not palpable after C1\par Planning to move to [place] after ACX4

## 2021-01-09 NOTE — PHYSICAL EXAM
[Fully active, able to carry on all pre-disease performance without restriction] : Status 0 - Fully active, able to carry on all pre-disease performance without restriction [Normal] : affect appropriate [de-identified] : port in the right chest wall- cdi slight opening to suture site no s/s of infection noted  [de-identified] : left breast: mass approx 2 cm but not easily palpable. No skin changes. Axillary node not palpable. (((NITIAL PRESENTATION: visible lump at 10:00. no skin changes or nipple retraction. On palpation- 5 cm hard, non tender, mobile mass at 10:00. + palpable left axillary LN. No abnormal right breast findings. No palpable cervical LN)))

## 2021-01-12 NOTE — HISTORY OF PRESENT ILLNESS
[Other:____] : [unfilled] [Verbal consent obtained from patient] : the patient, [unfilled] [Home] : at home, [unfilled] , at the time of the visit. [Other Location: e.g. Home (Enter Location, City,State)___] : at [unfilled] [FreeTextEntry1] : REASON FOR VISIT\par Sudha Elvin Nuñez MD,  86, is a 35 year old   woman of [place]ese and Russian descent who was seen via Telehealth for consultation at the [facility] ([facility]) on 2021.   Elvin was referred by Dr. Viv Ca for hereditary cancer predisposition risk assessment and counseling, due to a personal and family history of breast cancer.  Her motivation for counseling and testing was to better understand the etiology of her cancer and the risks to herself and her family in light of her genetic test results.  \par \par HISTORY OF PRESENT ILLNESS \par  Elvin is a 35-year-old female who was diagnosed with poorly differentiated, Safford score 8/9, ER weakly positive, MA negative, Her2 negative, invasive ductal carcinoma of the left breast in 2020.  She is currently undergoing neoadjuvant chemotherapy in preparation for surgery.  Whether she will have lumpectomy, unilateral, or bilateral mastectomy has not yet been decided. \par \par Environmental Exposures \par Environmental exposures to drugs, alcohol, smoke, and second-hand smoke reported negative.  She is a non-smoker, drinks socially, sees her primary care physician annually and her OBGYN annually, exercised regularly until her recent diagnosis, eats healthfully, and is currently in a residency program for emergency medicine. \par \par Current Cancer Surveillance includes:\par •	Mammogram: first this October due to palpable mass, positive for invasive ductal carcinoma.\par •	Colonoscopy: no/not yet indicated\par •	Dermatologic exam: years ago for suspicious mole, benign\par •	Pap smear: annual, negative\par \par Ht:  5’6”   Wt: 68 Kg\par Obstetrical History:    \par Age at Menarche:   13\par Premenopausal (currently on Lupron)\par Oral Contraceptive Use: yes, ten years from 20-30\par Hormone Replacement Therapy: no\par \par PAST MEDICAL HISTORY \par none\par \par PAST SURGICAL HISTORY \par Neck cyst removed, \par \par FAMILY HISTORY\par A four-generation family history was constructed and scanned into [employer].  Maternal ancestry was reported as Marshallese and Russian and paternal ancestry was reported as Salvadorian and [place]ese.  No Ashkenazi Roman Catholic ancestry.  Family history is significant for:\par •	Maternal grandmother – – living at age 82, breast cancer diagnosed at age 49\par •	Maternal grandmother’s sister #1-   at unknown age, ovarian cancer diagnosed at unknown age\par •	Maternal grandmother’s sister #2– at age 76, breast cancer diagnosed at age 74\par •	Mother’s maternal female first cousin – living at age 58, breast cancer diagnosed at age 48, thyroid cancer –NOS diagnosed at age 57, treated with radioactive iodine\par •	Paternal grandmother –  at age 96, breast cancer at age 60, GYN cancer-NOS in her 80’s\par •	Paternal grandfather –  at age 76, may have had liver or pancreatic cancer\par •	Her parents are non-consanguineous\par \par RISK ASSESSMENT AND GENETIC COUNSELING\par \par At her session which Dr. Oconnor attended alone, the differences between hereditary and sporadic cancer were reviewed.  Most breast cancer is sporadic, or occurring randomly.  Only 10% of cases of ovarian cancer, and 5-10% of cases of breast cancer are due to an inherited predisposition.  Of the hereditary cases, the majority are due to a mutation in either of two genes, known as BRCA1 and BRCA2.  A significant percentage of cases are due mutations in other known oncogenes, mismatch repair genes, tumor suppressor genes, or in as yet unidentified genes.  \par \par Characteristics of hereditary breast and ovarian cancers include a family history of breast and/or ovarian cancer, early age of onset of breast cancer (under 50 years), bilateral breast cancer, breast and ovarian cancers in the same individual, a family history of male breast cancer, and Ashkenazi Roman Catholic background.  \par \par Dr. Oconnor had genetic testing using the Breast Cancer Management panel at [facility] in 2020 ordered by Dr. Ca.  The results were NEGATIVE, except for a variant of uncertain significance in MELISSA discussed below.   No known disease-causing mutations were detected in any of the 9 genes analyzed.  Therefore, this test did NOT identify a genetic predisposition for cancer in this patient. \par \par Limitations of negative results were emphasized:\par 1)	Her cancer may have developed randomly, or due to environmental factors.  \par 2)	She may have a mutation that cannot be detected at this time, whether due to limitations in testing technology or the presence of a mutation in a gene not tested, or in an unidentified gene.  \par 3)	If there is a hereditary cancer predisposition in the family, the patient may not have inherited it. \par A negative result is most meaningful when a family member has had a positive result.  If at any point she learns of a relative who tests positive, then their result may influence the interpretation of Dr. Sagastume’s own result.  If anything changes in the personal and/or family history, [facility] so risks may be reviewed.  Knowledge of genetics evolves constantly.  Health changes or research updates may alter risk assessment and recommendations.\par \par A Variant of Uncertain Significance (VUS) was detected in the MELISSA gene known as c.1435G>T, or p.[id number].  Other laboratories concur with this classification.  MELISSA is considered to be a moderate-risk gene.  A pathogenic mutation in MELISSA, which is not what Dr. Oconnor has, is associated with a small but increased risk of breast, ovarian, and pancreatic cancers compared to risks in the general population.  At this time, there is not enough evidence to determine whether or not cancer risks are increased.  This result does not change medical management.  With more research, a VUS may be reclassified as either disease-causing or benign.  The patient was encouraged to contact the [facility] annually to inquire about any new information for this variant.  \par \par Dr. Oconnor the opportunity to enroll in hereditary cancer research through the Prospective Registry of Multiplex Testing (PROMPT). The purpose of the PROMPT registry is to allow researchers to better understand the cancer risks associated with changes in genes.  Researchers hope to provide a better understanding of the best way to take care of individuals who have such changes. For more information, see www.promptstudy.info, or [email or web address].\par \par in addition, Sridevi Oconnor was offered testing for an expanded panel of genes which could be performed at the same laboratory and on the same specimen as her original gene test.  She will inform us as to whether she wishes to pursue more testing.  \par \par The possibility of developing cancer by chance or due to an unknown hereditary cancer syndrome cannot be eliminated.  Integrating habits that can reduce non-genetic risk factors for cancer and promote a healthy lifestyle are also important.  These include always using sunscreen (SPF 15 or greater), avoiding the use of tobacco products, eating a balanced diet, exercising regularly, and maintaining a healthy weight.  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  \par \par She expressed understanding of the presented information and satisfaction with having their questions and concerns addressed.  \par \par Plan:\par 1.	Follow up with your physician for cancer surveillance.  The National Comprehensive Cancer Network and American Cancer Society are both resources for cancer screening guidelines.  \par 2.	A copy of genetic testing results is in her electronic medical records. \par 3.	Management decisions should be based on her personal and family histories in accordance with her on- and post-treatment protocol per her oncologist.  \par 4.	Testing of her mother may help in the interpretation of her own test results.  however, we understand [place] and may not have access to testing.\par 5.	Contact [facility] yearly to check on any changes in interpretation of a VUS, or if there are changes in the personal or family cancer history.  \par \par We remain available should there be any new information for personal or family history.  If there are any additional questions, please feel free to call the [facility] at [phone].\par \par For any additional questions please call the [facility] at [phone].\par \par \par Patient seen by Magda Farr MS, [facility] and Mahad March MD, Clinical \par \par cc: 	Viv Ca MD\par 	Sudha Nuñez MD\par \par

## 2021-01-12 NOTE — HISTORY OF PRESENT ILLNESS
[Other:____] : [unfilled] [Verbal consent obtained from patient] : the patient, [unfilled] [Home] : at home, [unfilled] , at the time of the visit. [Other Location: e.g. 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   Elvin was referred by Dr. Viv Ca for hereditary cancer predisposition risk assessment and counseling, due to a personal and family history of breast cancer.  Her motivation for counseling and testing was to better understand the etiology of her cancer and the risks to herself and her family in light of her genetic test results.  \par \par HISTORY OF PRESENT ILLNESS \par  Elvin is a 35-year-old female who was diagnosed with poorly differentiated, Eastham score 8/9, ER weakly positive, PA negative, Her2 negative, invasive ductal carcinoma of the left breast in 2020.  She is currently undergoing neoadjuvant chemotherapy in preparation for surgery.  Whether she will have lumpectomy, unilateral, or bilateral mastectomy has not yet been decided. \par \par Environmental Exposures \par Environmental exposures to drugs, alcohol, smoke, and second-hand smoke reported negative.  She is a non-smoker, drinks socially, sees her primary care physician annually and her OBGYN annually, exercised regularly until her recent diagnosis, eats healthfully, and is currently in a residency program for emergency medicine. \par \par Current Cancer Surveillance includes:\par •	Mammogram: first this October due to palpable mass, positive for invasive ductal carcinoma.\par •	Colonoscopy: no/not yet indicated\par •	Dermatologic exam: years ago for suspicious mole, benign\par •	Pap smear: annual, negative\par \par Ht:  5’6”   Wt: 68 Kg\par Obstetrical History:    \par Age at Menarche:   13\par Premenopausal (currently on Lupron)\par Oral Contraceptive Use: yes, ten years from 20-30\par Hormone Replacement Therapy: no\par \par PAST MEDICAL HISTORY \par none\par \par PAST SURGICAL HISTORY \par Neck cyst removed, \par \par FAMILY HISTORY\par A four-generation family history was constructed and scanned into [employer].  Maternal ancestry was reported as Ivorian and Azerbaijani and paternal ancestry was reported as Salvadorian and [place]vorian.  No Ashkenazi Islam ancestry.  Family history is significant for:\par •	Maternal grandmother – – living at age 82, breast cancer diagnosed at age 49\par •	Maternal grandmother’s sister #1-   at unknown age, ovarian cancer diagnosed at unknown age\par •	Maternal grandmother’s sister #2– at age 76, breast cancer diagnosed at age 74\par •	Mother’s maternal female first cousin – living at age 58, breast cancer diagnosed at age 48, thyroid cancer –NOS diagnosed at age 57, treated with radioactive iodine\par •	Paternal grandmother –  at age 96, breast cancer at age 60, GYN cancer-NOS in her 80’s\par •	Paternal grandfather –  at age 76, may have had liver or pancreatic cancer\par •	Her parents are non-consanguineous\par \par RISK ASSESSMENT AND GENETIC COUNSELING\par \par At her session which Dr. Oconnor attended alone, the differences between hereditary and sporadic cancer were reviewed.  Most breast cancer is sporadic, or occurring randomly.  Only 10% of cases of ovarian cancer, and 5-10% of cases of breast cancer are due to an inherited predisposition.  Of the hereditary cases, the majority are due to a mutation in either of two genes, known as BRCA1 and BRCA2.  A significant percentage of cases are due mutations in other known oncogenes, mismatch repair genes, tumor suppressor genes, or in as yet unidentified genes.  \par \par Characteristics of hereditary breast and ovarian cancers include a family history of breast and/or ovarian cancer, early age of onset of breast cancer (under 50 years), bilateral breast cancer, breast and ovarian cancers in the same individual, a family history of male breast cancer, and Ashkenazi Islam background.  \par \par Dr. Oconnor had genetic testing using the Breast Cancer Management panel at [facility] in 2020 ordered by Dr. Ca.  The results were NEGATIVE, except for a variant of uncertain significance in MELISSA discussed below.   No known disease-causing mutations were detected in any of the 9 genes analyzed.  Therefore, this test did NOT identify a genetic predisposition for cancer in this patient. \par \par Limitations of negative results were emphasized:\par 1)	Her cancer may have developed randomly, or due to environmental factors.  \par 2)	She may have a mutation that cannot be detected at this time, whether due to limitations in testing technology or the presence of a mutation in a gene not tested, or in an unidentified gene.  \par 3)	If there is a hereditary cancer predisposition in the family, the patient may not have inherited it. \par A negative result is most meaningful when a family member has had a positive result.  If at any point she learns of a relative who tests positive, then their result may influence the interpretation of Dr. Sagastume’s own result.  If anything changes in the personal and/or family history, [facility] so risks may be reviewed.  Knowledge of genetics evolves constantly.  Health changes or research updates may alter risk assessment and recommendations.\par \par A Variant of Uncertain Significance (VUS) was detected in the MELISSA gene known as c.1435G>T, or p.[id number].  Other laboratories concur with this classification.  MELISSA is considered to be a moderate-risk gene.  A pathogenic mutation in MELISSA, which is not what Dr. Oconnor has, is associated with a small but increased risk of breast, ovarian, and pancreatic cancers compared to risks in the general population.  At this time, there is not enough evidence to determine whether or not cancer risks are increased.  This result does not change medical management.  With more research, a VUS may be reclassified as either disease-causing or benign.  The patient was encouraged to contact the [facility]W annually to inquire about any new information for this variant.  \par \par Dr. Oconnor the opportunity to enroll in hereditary cancer research through the Prospective Registry of Multiplex Testing (PROMPT). The purpose of the PROMPT registry is to allow researchers to better understand the cancer risks associated with changes in genes.  Researchers hope to provide a better understanding of the best way to take care of individuals who have such changes. For more information, see www.promptstudy.info, or [email or web address].\par \par in addition, Sridevi Oconnor was offered testing for an expanded panel of genes which could be performed at the same laboratory and on the same specimen as her original gene test.  She will inform us as to whether she wishes to pursue more testing.  \par \par The possibility of developing cancer by chance or due to an unknown hereditary cancer syndrome cannot be eliminated.  Integrating habits that can reduce non-genetic risk factors for cancer and promote a healthy lifestyle are also important.  These include always using sunscreen (SPF 15 or greater), avoiding the use of tobacco products, eating a balanced diet, exercising regularly, and maintaining a healthy weight.  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  \par \par She expressed understanding of the presented information and satisfaction with having their questions and concerns addressed.  \par \par Plan:\par 1.	Follow up with your physician for cancer surveillance.  The National Comprehensive Cancer Network and American Cancer Society are both resources for cancer screening guidelines.  \par 2.	A copy of genetic testing results is in her electronic medical records. \par 3.	Management decisions should be based on her personal and family histories in accordance with her on- and post-treatment protocol per her oncologist.  \par 4.	Testing of her mother may help in the interpretation of her own test results.  however, we understand that she is out of the [place] and may not have access to testing.\par 5.	Contact [facility] yearly to check on any changes in interpretation of a VUS, or if there are changes in the personal or family cancer history.  \par \par We remain available should there be any new information for personal or family history.  If there are any additional questions, please feel free to call the [facility] at [phone].\par \par For any additional questions please call the [facility] at [phone].\par \par \par Patient seen by Magda Farr MS, [facility] and Mahad March MD, Clinical \par \par cc: 	Viv Ca MD\par 	Sudha Nuñez MD\par \par

## 2021-02-19 ENCOUNTER — APPOINTMENT (OUTPATIENT)
Dept: PEDIATRIC MEDICAL GENETICS | Facility: CLINIC | Age: 36
End: 2021-02-19
Payer: COMMERCIAL

## 2021-02-19 PROCEDURE — 99202 OFFICE O/P NEW SF 15 MIN: CPT | Mod: 95

## 2021-02-19 PROCEDURE — 99212 OFFICE O/P EST SF 10 MIN: CPT | Mod: 95

## 2021-02-24 NOTE — HISTORY OF PRESENT ILLNESS
[Home] : at home, [unfilled] , at the time of the visit. 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 \par \par GENETIC COUNSELING AND RISK ASSESSMENT\par \par  Elvin Nuñez is a 35-year-old  female who was originally seen at the [facility] on 2021 for hereditary cancer predisposition risk assessment.  She was diagnosed with invasive ductal carcinoma in 2020 and was found to carry a variant of uncertain significance in MELISSA known as c.1435G>T in her 9-gene breast cancer management panel.  She decided to pursue expanded genetic testing using the Common Comprehensive Cancers Panel offered by [facility].\par  \par The results were NEGATIVE.   No known disease-causing mutations were detected in any of the 47 genes analyzed.  Therefore, this test did NOT identify a genetic predisposition for cancer in this patient. \par \par Genes Analyzed: \par \par APC, MELISSA, AXIN2, BAP1, BARD1, BMPR1A, BRCA1, BRCA2, BRIP1, CDH1, CDK4, CDKN2A, CHEK2, EPCAM, FANCC, FANCM, FH, FLCN, HOXB13, MET, MITF, MLH1, MSH2, MSH6, MUTYH, NBN, NF1, NTHL1, PALB2, PMS2, POLD1, POLE, POT1, PTEN, RAD51C, RAD51D, RECQL, SCG5/GREM1, SDHB, SDHC, SDHD, SMAD4, STK11, TP53, TSC1, TSC2, VHL (47 genes)\par \par Limitations of negative results were emphasized:\par 1)	The patient’s cancer may have developed randomly, or due to environmental factors.  \par 2)	The patient may have a mutation that cannot be detected at this time, whether due to limitations in testing technology or the presence of a mutation in a gene not tested, or in an unidentified gene.  \par \par If anything changes in the personal and/or family history, please contact [facility] so risks may be reviewed.  Knowledge of genetics evolves constantly.  Health changes or research updates may alter risk assessment and recommendations.\par \par NO pathogenic variants were identified in any of the genes tested.  A Variant of Uncertain Significance (VUS) was detected in the MELISSA gene (c.1435G>T).  At this time, there is not enough evidence to determine whether or not cancer risks are increased.  This result does not change medical management.  With more research, a VUS may be reclassified as either disease-causing or benign.  Ms.  was encouraged to contact the CCPW annually to inquire about any new information for this variant.  \par \par The possibility of developing cancer by chance or due to an unknown hereditary cancer syndrome cannot be eliminated.  Integrating habits that can reduce non-genetic risk factors for cancer and promote a healthy lifestyle are also important.  These include always using sunscreen (SPF 15 or greater), avoiding the use of tobacco products, eating a balanced diet, exercising regularly, and maintaining a healthy weight.  \par \par Breast cancer screening for the patient should follow her on- and post-treatment protocol per her oncologist.  \par   \par SUMMARY AND PLAN  \par The patient has a personal history of breast cancer, tested NEGATIVE for a hereditary predisposition to cancer, and was found to carry a variant of unknown significance in MEILSSA.  A negative result does not entirely rule out a genetic predisposition to cancer.  At this time, the etiology of her diagnosis remains unknown.  She expressed understanding of the presented information and satisfaction with having her questions and concerns addressed. \par \par Plan:\par 1.	A copy of genetic testing results will be mailed to her, faxed to her doctor, and scanned to her electronic medical record.\par 2.	Other family may benefit from genetic testing, and should contact a certified genetic counselor specializing in cancer.  Due to HIPAA and [place] laws, Genetics is unable to directly contact other family at risk.  We understand that most of her family resides in [facility] where access to genetic services may be limited.  Her mother, who is in the , may be a candidate for discounted rates for counseling and for testing.  \par 3.	Call the CCPW yearly to check on any changes in interpretation of a VUS, or if there are changes in the personal or family cancer history.  \par \par We remain available should there be any new information for personal or family history.  If there are any additional questions, please feel free to call the [facility] at [phone].\par \par \par Patient seen by Magda Farr MS, [facility], and Jonathan Aguirre MD, Medical Geneticist\par \par Cc:	Viv Ca MD\par 	Sudha Nuñez MD\par \par

## 2021-02-24 NOTE — CONSULT LETTER
[DrLv  ___] : Dr. LOPES [___] : [unfilled] [Dear  ___] : Dear  [unfilled], [Consult Letter:] : I had the pleasure of evaluating your patient, [unfilled]. [Please see my note below.] : Please see my note below. [Sincerely,] : Sincerely, [FreeTextEntry2] : Viv Ca MD

## 2021-03-22 ENCOUNTER — APPOINTMENT (OUTPATIENT)
Dept: SURGERY | Facility: CLINIC | Age: 36
End: 2021-03-22

## 2021-04-05 ENCOUNTER — NON-APPOINTMENT (OUTPATIENT)
Age: 36
End: 2021-04-05

## 2021-04-29 ENCOUNTER — OUTPATIENT (OUTPATIENT)
Dept: OUTPATIENT SERVICES | Facility: HOSPITAL | Age: 36
LOS: 1 days | Discharge: HOME | End: 2021-04-29

## 2021-04-29 ENCOUNTER — NON-APPOINTMENT (OUTPATIENT)
Age: 36
End: 2021-04-29

## 2021-04-29 ENCOUNTER — APPOINTMENT (OUTPATIENT)
Dept: HEMATOLOGY ONCOLOGY | Facility: CLINIC | Age: 36
End: 2021-04-29
Payer: COMMERCIAL

## 2021-04-29 DIAGNOSIS — N75.0 CYST OF BARTHOLIN'S GLAND: Chronic | ICD-10-CM

## 2021-04-29 DIAGNOSIS — C50.919 MALIGNANT NEOPLASM OF UNSPECIFIED SITE OF UNSPECIFIED FEMALE BREAST: ICD-10-CM

## 2021-04-29 DIAGNOSIS — C50.912 MALIGNANT NEOPLASM OF UNSPECIFIED SITE OF LEFT FEMALE BREAST: ICD-10-CM

## 2021-04-29 PROCEDURE — 99214 OFFICE O/P EST MOD 30 MIN: CPT | Mod: 95

## 2021-05-13 PROBLEM — C50.912 BREAST CANCER, LEFT: Status: ACTIVE | Noted: 2020-10-28

## 2021-05-13 NOTE — PHYSICAL EXAM
[de-identified] : Constitutional: well developed, well nourished, in no acute distress. \par Eyes: no icterus seen. no conjunctival injection\par ENT: no tongue swelling, no nasal discharge\par Neck: no visible masses or goitre \par Pulmonary: no audible wheeze,  respirations unlabored\par Musculoskeletal: full range of motion, walking in the house\par Skin: no rash visible on face or upper chest

## 2021-05-13 NOTE — ASSESSMENT
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  CT scan chest abdomen pelvis and a bone scan is negative for distant metastases 10/2020. Declined egg cryopreservation. BRCA neg. She started ddACT 11/4/2020\par \par Patient completed 4 cycles of AC chemotherapy and moved to [place].  She received 12 treatments of Taxol, completed on 3/17/2021.  She reports no significant toxicity/neuropathy and she was able to complete 12 cycles without delay.  She Underwent bilateral mastectomies on 4/12/2021.  She is still in [place] and requested a teleconsult to review further treatment.\par Path report reviewed with the patient.  She has 4 to 5 mm residual poorly differentiated invasive ductal carcinoma.  repeat markers: ER 0, ME 20, HER 0. She is meeting with radiation oncology in [place] and planning to take postmastectomy radiation.  She also met with medical oncology in [place] and 6 months of Xeloda was recommended.\par \par I discussed with her that postmastectomy radiation is reasonable given large tumor size at presentation.  She does have excellent response but has small amount of residual disease.  Her tumor was low ER/ME positive and behaved clinically like triple negative therefore adjuvant Xeloda is reasonable.  She does not qualify for any research protocol. We reviewed dose modification from [facility].  Side effects of treatment and schedule for Xeloda was reviewed with her.  She does have low ER/ME positivity and will be a candidate for adjuvant therapy as well. We discussed that Xeloda is typically done after radiation.  She will start endocrine therapy after completing Xeloda.  She also inquired about future pregnancy.  We discussed that pregnancy can be considered after 2 to 3 years of endocrine therapy.  She was encouraged to get Covid vaccine. She is getting monthly lupron. \par \par rto upon return from [place]\par

## 2021-05-13 NOTE — HISTORY OF PRESENT ILLNESS
[de-identified] : Ms.Ana López is a 35 year old female here for an evaluation of breast cancer. Her oncologic history is as follows:\par \par Patient felt a palpable lump in 8/2020. She underwent breast imaging on 10/8/2020 BIRADS 4B which showed  a 3.2 cm mass in the central upper slightly inner left breast and a lobulated solid mass with cystic components measuring 2.6 x 2.5 x 3.3 cm. There is also a smaller anterior mass measuring 7mm. US showed a large lobulated mass in the central upper inner left breast at the 10:00 axis 2 cm FN.  She underwent a left breast 10 o'clock, 2 cm FN, ultrasound guided core biopsy on 10/16/2020 which showed invasive poorly differentiated ductal carcinoma with focal central necrosis, Nice score 8/9 measuring least 0.8 cm, ER Positive, 10%, PgR Negative, 0%, HER-2 Negative\par \par She underwent a breast MRI on 10/21/2020 BIRADS 6 which showed  left breast malignancy, a dominant mass in the left inner breast, measuring 5.0 cm. There are multiple surrounding satellite masses. The majority of the upper inner and central breast demonstrates disease. The dominant mass and associated satellites measure approximately 6.5 cm transverse x 8.5 cm AP x  4.5 cm sagittal in overall greatest dimension. There is associated parenchymal edema and skin thickening involving the inner breast. There is also nonspecific enhancement of the left nipple. There are multiple matted lymph nodes in the left axilla. No evidence of internal mammary adenopathy. A nonenhancing cystic mass in the left anterior chest/mediastinum was also noted. \par \par 10/27/2020 NM BONE SCAN :No scan evidence of osseous metastasis.\par 9/4/2020 EKG NSR \par CT 10/27/2020 MARI, + epicardial cyst\par \par She has been  x 10 years.  is a  in [place]. Her family is in [place]. No children. She lives in [facility] and considering moving to [place]. 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  She received 12 treatments of Taxol on 3/17/2021.  She reports no significant toxicity/neuropathy and she was able to complete 12 cycles without delay.  She Underwent bilateral mastectomies on 4/12/2021.  She is still in [place] and requested a teleconsult to review further treatment.\par Path report reviewed with the patient.  She has 4 to 5 mm residual poorly differentiated invasive ductal carcinoma.  repeat markers: ER 0, MD 20, HER 0. She is meeting with radiation oncology in [place] and planning to take postmastectomy radiation.  She also met with medical oncology in [place] and 6 months of Xeloda was recommended.\par \par I discussed with her that postmastectomy radiation is reasonable given large tumor size at presentation.  She does have excellent response but has small amount of residual disease.  Her tumor was low ER/MD positive and behaved clinically like triple negative therefore adjuvant Xeloda is reasonable.  She does not qualify for any research protocol. We reviewed dose modification from [facility].  Side effects of treatment and schedule for Xeloda was reviewed with her.  She does have low ER/MD positivity and will be a candidate for adjuvant therapy as well. We discussed that Xeloda is typically done after radiation.  She will start endocrine therapy after completing Xeloda.  She also inquired about future pregnancy.  We discussed that pregnancy can be considered after 2 to 3 years of endocrine therapy.  She was encouraged to get Covid vaccine. She is getting monthly lupron. \par

## 2022-04-11 PROBLEM — Z11.59 SCREENING FOR VIRAL DISEASE: Status: ACTIVE | Noted: 2020-08-18

## 2023-08-17 NOTE — PRE-ANESTHESIA EVALUATION ADULT - WEIGHT IN KG
Patients voicemail if full not able to leave a message.            Jorge Echevarria MD P Brooks, J [facility] Nurse Msg Pool  Can someone let Deisy know that the visual field and pachymetry tests look good.  Her intra-ocular pressure is excellent on the latanoprost.  I would like her to continue the latanoprost in each eye at bedtime.  Keep scheduled surgery date.     
64.9

## 2025-01-07 NOTE — PRE PROCEDURE NOTE - PRE PROCEDURE EVALUATION
Interventional Radiology Pre-Procedure Note    This is a 35y Female with recent diagnosis of left breast CA who presents to IR for port placement. Patient reports she is planned for initiation of chemotherapy on Wed 11/4. Denies fever, chills, nausea, vomiting, chest pain, SOB.     NPO: 9pm yesterday  Antibiotics: n/a  Anticoagulation: denies  Adverse events to anesthesia denies    PAST MEDICAL & SURGICAL HISTORY:     Vital Signs Last 24 Hrs  T(C): 37.2 (02 Nov 2020 07:23), Max: 37.2 (02 Nov 2020 07:23)  T(F): 98.9 (02 Nov 2020 07:23), Max: 98.9 (02 Nov 2020 07:23)  HR: 64 (02 Nov 2020 07:23) (64 - 64)  BP: 103/52 (02 Nov 2020 07:23) (103/52 - 103/52)  RR: 16 (02 Nov 2020 07:23) (16 - 16)  SpO2: 99% (02 Nov 2020 07:23) (99% - 99%)    Allergies: No Known Allergies    Physical Exam: Gen: NAD, A&Ox3    Labs: 10/28  INR: 0.94  K:4.4  Cr:0.72      Plan is for chest wall port placement. Informed consent obtained. All questions and concerns have been addressed at this time.      on chart